# Patient Record
Sex: FEMALE | Race: WHITE | NOT HISPANIC OR LATINO | Employment: FULL TIME
[De-identification: names, ages, dates, MRNs, and addresses within clinical notes are randomized per-mention and may not be internally consistent; named-entity substitution may affect disease eponyms.]

---

## 2017-08-19 ENCOUNTER — HEALTH MAINTENANCE LETTER (OUTPATIENT)
Age: 32
End: 2017-08-19

## 2021-02-26 ENCOUNTER — OFFICE VISIT (OUTPATIENT)
Dept: URGENT CARE | Facility: URGENT CARE | Age: 36
End: 2021-02-26
Payer: COMMERCIAL

## 2021-02-26 ENCOUNTER — E-VISIT (OUTPATIENT)
Dept: URGENT CARE | Facility: URGENT CARE | Age: 36
End: 2021-02-26
Payer: COMMERCIAL

## 2021-02-26 ENCOUNTER — RESULTS ONLY (OUTPATIENT)
Dept: LAB | Age: 36
End: 2021-02-26

## 2021-02-26 DIAGNOSIS — Z20.822 SUSPECTED COVID-19 VIRUS INFECTION: ICD-10-CM

## 2021-02-26 DIAGNOSIS — Z20.822 SUSPECTED COVID-19 VIRUS INFECTION: Primary | ICD-10-CM

## 2021-02-26 DIAGNOSIS — Z53.9 ERRONEOUS ENCOUNTER--DISREGARD: Primary | ICD-10-CM

## 2021-02-26 LAB
LABORATORY COMMENT REPORT: NORMAL
SARS-COV-2 RNA RESP QL NAA+PROBE: NEGATIVE
SARS-COV-2 RNA RESP QL NAA+PROBE: NORMAL
SPECIMEN SOURCE: NORMAL
SPECIMEN SOURCE: NORMAL

## 2021-02-26 PROCEDURE — 99421 OL DIG E/M SVC 5-10 MIN: CPT | Performed by: FAMILY MEDICINE

## 2021-02-26 NOTE — PATIENT INSTRUCTIONS
Dear Maryanne Head,    Your symptoms show that you may have coronavirus (COVID-19). This illness can cause fever, cough and trouble breathing. Many people get a mild case and get better on their own. Some people can get very sick.    Will I be tested for COVID-19?  We would like to test you for Covid-19 virus. I have placed orders for this test.     For all employees or close contacts (except [facility] and [facility] - see below), go to your [employer] home page and scroll down to the section that says  You have an appointment that needs to be scheduled  and click the large green button that says  Schedule Now  and follow the steps to find the next available opening.     If you are unable to complete these steps or if you cannot find any available times, please call [phone] to schedule employee testing.     [facility] employees or close contacts, please call [phone].   [place] ([facility]) employees or close contacts call [phone].    Return to work/school/ guidance:  Please let your workplace manager and staffing office know when your quarantine ends     We can t give you an exact date as it depends on the above. You can calculate this on your own or work with your manager/staffing office to calculate this. (For example if you were exposed on 10/4, you would have to quarantine for 14 full days. That would be through 10/18. You could return on 10/19.)      If you receive a positive COVID-19 test result, follow the guidance of the those who are giving you the results. Usually the return to work is 10 (or in some cases 20 days from symptom onset.) If you work at [employer] [place], you must also be cleared by Employee Occupational Health and Safety to return to work.        If you receive a negative COVID-19 test result and did not have a high risk exposure to someone with a known positive COVID-19 test, you can return to work once you're free of fever for 24 hours without fever-reducing medication and  your symptoms are improving or resolved.      If you receive a negative COVID-19 test and If you had a high risk exposure to someone who has tested positive for COVID-19 then you can return to work 14 days after your last contact with the positive individual    Note: If you have ongoing exposure to the covid positive person, this quarantine period may be more than 14 days. (For example, if you are continued to be exposed to your child who tested positive and cannot isolate from them, then the quarantine of 7-14 days can't start until your child is no longer contagious. This is typically 10 days from onset of the child's symptoms. So the total duration may be 17-24 days in this case.)    Sign up for [employer].   We know it's scary to hear that you might have COVID-19. We want to track your symptoms to make sure you're okay over the next 2 weeks. Please look for an email from [employer]--this is a free, online program that we'll use to keep in touch. To sign up, follow the link in the email you will receive. Learn more at [email or web address]    How can I take care of myself?    Get lots of rest. Drink extra fluids (unless a doctor has told you not to)    Take Tylenol (acetaminophen) or ibuprofen for fever or pain. If you have liver or kidney problems, ask your family doctor if it's okay to take Tylenol o ibuprofen    If you have other health problems (like cancer, heart failure, an organ transplant or severe kidney disease): Call your specialty clinic if you don't feel better in the next 2 days.    Know when to call 911. Emergency warning signs include:  o Trouble breathing or shortness of breath  o Pain or pressure in the chest that doesn't go away  o Feeling confused like you haven't felt before, or not being able to wake up  o Bluish-colored lips or face    Where can I get more information?   [place] - About COVID-19:   [email or web address]    CDC - What to Do If You're Sick:    www.cdc.gov/coronavirus/2019-ncov/about/steps-when-sick.html

## 2021-04-10 ENCOUNTER — HEALTH MAINTENANCE LETTER (OUTPATIENT)
Age: 36
End: 2021-04-10

## 2021-04-14 ENCOUNTER — OFFICE VISIT (OUTPATIENT)
Dept: MIDWIFE SERVICES | Facility: CLINIC | Age: 36
End: 2021-04-14
Payer: COMMERCIAL

## 2021-04-14 VITALS
HEART RATE: 88 BPM | DIASTOLIC BLOOD PRESSURE: 85 MMHG | HEIGHT: 65 IN | OXYGEN SATURATION: 98 % | BODY MASS INDEX: 24.16 KG/M2 | SYSTOLIC BLOOD PRESSURE: 133 MMHG | WEIGHT: 145 LBS

## 2021-04-14 DIAGNOSIS — Z01.419 WELL WOMAN EXAM WITH ROUTINE GYNECOLOGICAL EXAM: Primary | ICD-10-CM

## 2021-04-14 LAB
CHOLEST SERPL-MCNC: 185 MG/DL
HBA1C MFR BLD: 5.1 % (ref 0–5.6)
TSH SERPL DL<=0.005 MIU/L-ACNC: 2.22 MU/L (ref 0.4–4)

## 2021-04-14 PROCEDURE — G0145 SCR C/V CYTO,THINLAYER,RESCR: HCPCS | Performed by: ADVANCED PRACTICE MIDWIFE

## 2021-04-14 PROCEDURE — 82465 ASSAY BLD/SERUM CHOLESTEROL: CPT | Performed by: ADVANCED PRACTICE MIDWIFE

## 2021-04-14 PROCEDURE — 84443 ASSAY THYROID STIM HORMONE: CPT | Performed by: ADVANCED PRACTICE MIDWIFE

## 2021-04-14 PROCEDURE — 83036 HEMOGLOBIN GLYCOSYLATED A1C: CPT | Performed by: ADVANCED PRACTICE MIDWIFE

## 2021-04-14 PROCEDURE — G0124 SCREEN C/V THIN LAYER BY MD: HCPCS | Performed by: PATHOLOGY

## 2021-04-14 PROCEDURE — 36415 COLL VENOUS BLD VENIPUNCTURE: CPT | Performed by: ADVANCED PRACTICE MIDWIFE

## 2021-04-14 PROCEDURE — 87624 HPV HI-RISK TYP POOLED RSLT: CPT | Performed by: ADVANCED PRACTICE MIDWIFE

## 2021-04-14 PROCEDURE — 99385 PREV VISIT NEW AGE 18-39: CPT | Performed by: ADVANCED PRACTICE MIDWIFE

## 2021-04-14 RX ORDER — VALACYCLOVIR HYDROCHLORIDE 500 MG/1
500 TABLET, FILM COATED ORAL 2 TIMES DAILY
COMMUNITY
End: 2023-07-14

## 2021-04-14 SDOH — HEALTH STABILITY: MENTAL HEALTH: HOW OFTEN DO YOU HAVE 6 OR MORE DRINKS ON ONE OCCASION?: NEVER

## 2021-04-14 SDOH — HEALTH STABILITY: MENTAL HEALTH: HOW OFTEN DO YOU HAVE A DRINK CONTAINING ALCOHOL?: 4 OR MORE TIMES A WEEK

## 2021-04-14 SDOH — HEALTH STABILITY: MENTAL HEALTH: HOW MANY STANDARD DRINKS CONTAINING ALCOHOL DO YOU HAVE ON A TYPICAL DAY?: 1 OR 2

## 2021-04-14 ASSESSMENT — MIFFLIN-ST. JEOR: SCORE: 1343.6

## 2021-04-14 NOTE — PROGRESS NOTES
Maryanne is a 35 year old No obstetric history on file. female who presents for annual exam.     Menses are regular q 28-30 days and normal lasting 4 days.  Menses flow: normal.  Patient's last menstrual period was 2021.. Using none for contraception.  She is  currently considering pregnancy.  Besides routine health maintenance, she has no other health concerns today .  1. Reports having an abnormal pap smear, with colpo and bx.  Then had a follow up pap at the beginning of her pregnancy about 2  1/2 yrs ago which showed there had been an improvement in her results but has not had a chance to follow up since then.  2. Pt would like to conceive again.  , has an 18 mo/old.  Had infertility testing for her and her  including a normal hsg,  had a normal semen analysis.  Pt had one cycle of clomid, then a trigger shot and conceived on that round.    2019  pt reported uncomplicated pregnancy, was induced for post dates,  x 10 months  Has been tracking cycles and noting ovulation discharge since 2021 (4 months)  Reports regular 28 day cycles, regular unprotected IC.  GYNECOLOGIC HISTORY:  Menarche:   Maryanne is sexually active with 1male partner(s) and is currently in monogamous relationship.    History sexually transmitted infections:Herpes  STI testing offered?  Declined  RAMANA exposure: Unknown  History of abnormal Pap smear: NO - age 30- 65 PAP every 3 years recommended  Family history of breast CA: No  Family history of uterine/ovarian CA: No    Family history of colon CA: No    HEALTH MAINTENANCE:  Cholesterol: (No results found for: CHOL History of abnormal lipids: No  Mammo: na . History of abnormal Mammo: Not applicable.  Regular Self Breast Exams: No  Calcium/Vitamin D intake: source:  dairy Adequate? 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 Mother         MI in the past - 2 stents in heart     Hypertension Mother      Alcohol/Drug Mother         sober since 83     Arthritis Mother      Cardiovascular Mother      Depression Mother      Heart Disease Mother      Bronchitis Mother      Coronary Artery Disease Mother      Kidney Disease Mother      Hypertension Father      Social History     Socioeconomic History     Marital status:      Spouse name: Not on file     Number of children: Not on file     Years of education: Not on file     Highest education level: Not on file   Occupational History     Not on file   Social Needs     Financial resource strain: Not on file     Food insecurity     Worry: Not on file     Inability: Not on file     Transportation needs     Medical: Not on file     Non-medical: Not on file   Tobacco Use     Smoking status: Former Smoker     Quit date: 3/1/2005     Years since quittin.1     Tobacco comment: smoked for about 2 yrs about 1/4 ppd   Substance and Sexual Activity     Alcohol use: Yes     Comment: rare     Drug use: Not on file     Sexual activity: Yes     Partners: Male     Birth control/protection: Pill   Lifestyle     Physical activity     Days per week: Not on file     Minutes per session: Not on file     Stress: Not on file   Relationships     Social connections     Talks on phone: Not on file     Gets together: Not on file     Attends Yarsani service: Not on file     Active member of club or organization: Not on file     Attends meetings of clubs or organizations: Not on file     Relationship status: Not  on file     Intimate partner violence     Fear of current or ex partner: Not on file     Emotionally abused: Not on file     Physically abused: Not on file     Forced sexual activity: Not on file   Other Topics Concern     Not on file   Social History Narrative    12/21/05    Balanced Diet - Yes    Osteoporosis Prevention Measures - Weight bearing exercise    Regular Exercise -  Yes Describe yoga    Dental Exam - NO    Eye Exam - YES - Date: couple weeks ago    Self Breast Exam - No    Abuse: Current or Past (Physical, Sexual or Emotional)- No    Do you feel safe in your environment - Yes    Guns stored in the home - No    Sunscreen used - Yes    Seatbelts used - Yes    Lipids -  UNKNOWN; last exam    Glucose -  UNKNOWN; last exam    Colon Cancer Screening - No    Hemoccults - NO    Pap Test -  YES - Date: 2 mo ago    Do you have any concerns about STD's -  No    Mammography - NO    DEXA - NO    Immunizations reviewed and up to date - Yes    Milton GOMEZ RN, BSN, PHN           Current Outpatient Medications:      Prenatal Vit-Fe Fumarate-FA (PRENATAL VITAMIN PO), , Disp: , Rfl:      valACYclovir (VALTREX) 500 MG tablet, Take 500 mg by mouth 2 times daily, Disp: , Rfl:    No Known Allergies    Past medical, surgical, social and family history were reviewed and updated in EPIC.    ROS:   C:     NEGATIVE for fever, chills, change in weight  I:       NEGATIVE for worrisome rashes, moles or lesions  E:     NEGATIVE for vision changes or irritation  E/M: NEGATIVE for ear, mouth and throat problems  R:     NEGATIVE for significant cough or SOB  CV:   NEGATIVE for chest pain, palpitations or peripheral edema  GI:     NEGATIVE for nausea, abdominal pain, heartburn, or change in bowel habits  :   NEGATIVE for frequency, dysuria, hematuria, vaginal discharge, or irregular bleeding  M:     NEGATIVE for significant arthralgias or myalgia  N:      NEGATIVE for weakness, dizziness or paresthesias  E:      NEGATIVE for temperature  "intolerance, skin/hair changes  P:      NEGATIVE for changes in mood or affect.    EXAM:  /85   Pulse 88   Ht 1.651 m (5' 5\")   Wt 65.8 kg (145 lb)   LMP 03/27/2021   SpO2 98%   BMI 24.13 kg/m     BMI: Body mass index is 24.13 kg/m .  Constitutional: healthy, alert and no distress  Head: Normocephalic. No masses, lesions, tenderness or abnormalities  Neck: Neck supple. Trachea midline. No adenopathy. Thyroid symmetric, normal size.   Cardiovascular: RRR.   Respiratory: Negative.   Breast: No nodularity, asymmetry or nipple discharge bilaterally.  Gastrointestinal: Abdomen soft, non-tender, non-distended. No masses, organomegaly.  :  Vulva:  No external lesions, normal female hair distribution, no inguinal adenopathy.    Urethra:  Midline, non-tender, well supported, no discharge  Vagina:  Moist, pink, no abnormal discharge, no lesions  Uterus:  Normal size , non-tender, freely mobile  Ovaries:  No masses appreciated, non-tender, mobile  Rectal Exam: deferred  Musculoskeletal: extremities normal  Skin: no suspicious lesions or rashes  Psychiatric: Affect appropriate, cooperative,mentation appears normal.     COUNSELING:   Reviewed preventive health counseling, as reflected in patient instructions       Regular exercise       Healthy diet/nutrition       conception   reports that she has been smoking cigarettes. She has never used smokeless tobacco.   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  Discussed effect of smoking cigarettes on fertility and on cervical health.  Pt stated understanding and that she intends to quit.    Discussed cycle tips for optimizing conception, pt very well informed already.  Plans that if not pregnant in 2 more months, so 6 months of unprotected IC, will seek appt with OB/Gyn.    Body mass index is 24.13 kg/m .    FRAX Risk " Assessment    ASSESSMENT:  35 year old female with satisfactory annual exam  (Z01.419) Well woman exam with routine gynecological exam  (primary encounter diagnosis)  Comment:   Plan: HPV High Risk Types DNA Cervical, Pap imaged         thin layer screen with HPV - recommended age 30        - 65 years (select HPV order below), TSH with         free T4 reflex, Cholesterol, Hemoglobin A1c    ZAYNAB Wagner CNM

## 2021-04-14 NOTE — PATIENT INSTRUCTIONS
Dear Maryanne     Congratulations on deciding that you would like to conceive.  Here are a couple of tips about getting pregnant.     First, it takes time, be patient.  It is normal to feel frustrated if you don t get pregnant on your first cycle of trying, but don t get discouraged, it will happen.  It takes the average couple eight months to conceive.      A great resource is the book, Taking Charge of Your Fertility      The basics of ovulation     Each month your body goes through several hormonal changes that causes you to release an egg from your ovary, this is called ovulation, and it is when you can become pregnant.      Typically this process takes about two weeks after the first day of your last period.  We begin counting the first day of your period and call this Day 1 of your menstrual cycle.     Once the egg has been released, you have ovulated and the egg is only fertile for about 24 hours.  The key is to have unprotected intercourse (sex) in the few days before and during ovulation.     Sperm can live and function for about 4-5 days in your uterus and fallopian tubes.  So the goal is to have the sperm there waiting or be there right as your ovulate.     Getting the timing right     The best way of increasing your odds in getting pregnant is to have sex at the correct time in your cycle.     If you have regular 28 day cycles you will ovulate about 12 to 14 days after the first day of your last period.  So your fertile window is 5-6 days just before you ovulate, (Day 6-12 of your menstrual cycle)     If you have irregular cycles it can be a little more difficult to find your ovulation window.        Signs of Ovulation     Cervical mucous discharge:  One of the best and most reliable ways to determine if you are ovulating is to watch for cervical mucous discharge.  It will be vaginal discharge that is clear, wet and slippery like an egg white.  This discharge coats the inside of the genital tract and makes  it easier and more slippery for the sperm to get up to the fallopian tube where the fertile egg will be.     Ovulation predictor kits  You can buy ovulation predictor kits over the counter.  These measure the ovulation hormone (Luteinizing Hormone or LH) in your urine.  You can use this in addition to watching for cervical mucous to double check when you are ovulating.     Basal Body Temperature  You can take your temperature every morning before you get out of bed. The body temperature is lower before ovulation and rises slightly afterwards. This is a less reliable way of tracking ovulation because the change occurs after ovulation.     Menstrual Cycle Tracking Apps  There are many apps for tracking your period and fertility windows.  Here a couple of web sites that review various ones.  We do not endorse a specific one, find one that seems to appeal to you and meets your needs.  Search  best fertility apps     https://www.[employer]/health/pregnancy/fertility-apps  https://www.parents.com/getting-pregnant/ovulation/fertile-days/the-10-best-period-and-ovulation-tracker-apps/       Positions, orgasms, and lube  There are a lot of myths about sex, fertility, and how to make pregnancy more likely. Some of these recommend different positions or keeping the hips elevated after sex for a period of time.      Others claim that if the woman orgasms (or doesn t), conception is more likely. Unfortunately, there are no studies that back up these claims.     The one thing you should think about is your lubricant. Certain products can decrease sperm motility and viability. These are important when trying to get pregnant.   You ll want to avoid:            Astroglide            K-Y jelly            saliva            olive oil  If you need to use a lubricant, try Pre-Seed, mineral oil, or canola oil. These products won't interfere with your partner s sperm.     Timing of ejaculation  For the male, it takes an average of about  72 hours to regenerate an adequate number of sperm.  So during your fertile window aim for unprotected sex about every other day, and not more than once a day.     Healthy body, healthy pregnancy    Before trying to get pregnant, you should try to be as healthy as possible.      At this preconception visit, you ll talk about existing health problems and get screened for genetic diseases. You can also address other health concerns you might have.  Your doctor might recommend that you make lifestyle changes before you get pregnant.      These might include:            getting to a healthy weight            improving diet/exercise habits            eliminating alcohol or cigarettes            cutting back on caffeine                If you drink a lot of coffee or soda, it may be helpful to begin cutting back now. Current recommendations are to limit caffeine intake to about 200 mg per day. This is equivalent to a 12-ounce cup of coffee.      You should also start taking a prenatal vitamin with at least 400 - 800 mcg of folic acid each day as soon as you decide to start trying to conceive. This is to reduce the risk of certain birth defects.     When to get help  Most healthy couples will conceive within a year of actively trying to get pregnant. If you don t get pregnant within a year and are under age 35, you should see your doctor for a fertility evaluation.      If you are over 35, you should only wait six months before seeing a doctor.  Couples should also see a fertility specialist if they have a history of multiple miscarriages or know that they have a genetic or medical condition that might affect their fertility.     The takeaway  It can be frustrating when pregnancy doesn't happen right away, but try to be patient. This is normal, and it doesn t mean that it will never happen for you. Try to keep baby-making fun and adventurous.   Stay relaxed and you ll increase your chances of getting that positive result  you ve been waiting for.     I wish you the best    ZAYNAB Wagner CNM

## 2021-04-19 LAB
COPATH REPORT: ABNORMAL
PAP: ABNORMAL

## 2021-04-20 ENCOUNTER — PATIENT OUTREACH (OUTPATIENT)
Dept: MIDWIFE SERVICES | Facility: CLINIC | Age: 36
End: 2021-04-20

## 2021-04-20 LAB
FINAL DIAGNOSIS: NORMAL
HPV HR 12 DNA CVX QL NAA+PROBE: NEGATIVE
HPV16 DNA SPEC QL NAA+PROBE: NEGATIVE
HPV18 DNA SPEC QL NAA+PROBE: NEGATIVE
SPECIMEN DESCRIPTION: NORMAL
SPECIMEN SOURCE CVX/VAG CYTO: NORMAL

## 2021-09-19 ENCOUNTER — HEALTH MAINTENANCE LETTER (OUTPATIENT)
Age: 36
End: 2021-09-19

## 2022-01-31 ENCOUNTER — TELEPHONE (OUTPATIENT)
Dept: OPHTHALMOLOGY | Facility: CLINIC | Age: 37
End: 2022-01-31
Payer: COMMERCIAL

## 2022-01-31 NOTE — TELEPHONE ENCOUNTER
[facility] Call Center    Phone Message    May a detailed message be left on voicemail: yes     Reason for Call: pt sent online appt req for eye exam due to floaters - per GL's they say to send Te to clinic     Please advise     Action Taken: Message routed to:  [facility] eye     Travel Screening: Not Applicable

## 2022-01-31 NOTE — TELEPHONE ENCOUNTER
I called and spoke with patient to schedule her for eye exam.  Last CEE was in 2017 in [place].  Has had minimal floaters in vision x 3 years.  Recent increase in number of floaters left eye, some flashes of light when lying down at night.  No eye pain, redness, tearing either eye.  BRADLY Rojo 1/31/2022 4:02 PM

## 2022-02-04 ENCOUNTER — OFFICE VISIT (OUTPATIENT)
Dept: OPHTHALMOLOGY | Facility: CLINIC | Age: 37
End: 2022-02-04
Attending: OPHTHALMOLOGY
Payer: COMMERCIAL

## 2022-02-04 DIAGNOSIS — H43.813 VITREOUS DEGENERATION OF BOTH EYES: Primary | ICD-10-CM

## 2022-02-04 PROCEDURE — 99203 OFFICE O/P NEW LOW 30 MIN: CPT | Mod: GC | Performed by: STUDENT IN AN ORGANIZED HEALTH CARE EDUCATION/TRAINING PROGRAM

## 2022-02-04 PROCEDURE — G0463 HOSPITAL OUTPT CLINIC VISIT: HCPCS

## 2022-02-04 ASSESSMENT — CUP TO DISC RATIO
OD_RATIO: 0.3
OS_RATIO: 0.3

## 2022-02-04 ASSESSMENT — CONF VISUAL FIELD
OD_NORMAL: 1
OS_NORMAL: 1

## 2022-02-04 ASSESSMENT — SLIT LAMP EXAM - LIDS
COMMENTS: NORMAL
COMMENTS: NORMAL

## 2022-02-04 ASSESSMENT — VISUAL ACUITY
METHOD: SNELLEN - LINEAR
OD_SC: 20/15
OS_SC: 20/15

## 2022-02-04 ASSESSMENT — EXTERNAL EXAM - RIGHT EYE: OD_EXAM: NORMAL

## 2022-02-04 ASSESSMENT — TONOMETRY
OD_IOP_MMHG: 16
OS_IOP_MMHG: 16
IOP_METHOD: TONOPEN

## 2022-02-04 ASSESSMENT — EXTERNAL EXAM - LEFT EYE: OS_EXAM: NORMAL

## 2022-02-04 NOTE — NURSING NOTE
Chief Complaints and History of Present Illnesses   Patient presents with     Floaters Both Eyes     Chief Complaint(s) and History of Present Illness(es)     Floaters Both Eyes     Laterality: left eye    Associated symptoms: flashes.  Negative for shade, peripheral vision loss, headache and photophobia    Pain scale: 0/10              Comments     Flashes and floaters LE X 6 months  Veronica WALLS 11:01 AM February 4, 2022

## 2022-02-04 NOTE — PROGRESS NOTES
CC - Flashes/floaters    INTERVAL HISTORY - Initial visit with me. Minimal floaters for 3 years but recent increase and associated flashes most noticeable at night in the LEFT eye. This has happened just a few times - last 2 weeks ago, happened one time and then stopped.     HPI -   Maryanne Head is a 36 year old year-old patient who presents for flashes/floaters in the LEFT eye.   Last eye exam 2017 in [place].   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    -Warning signs discussed, including worsening floaters/flashes, curtain/veil obscuring vision.    # Physiologic anisocoria   -Negative MRI/CT ~2005, per note from 4/1/2014   -Warning signs discussed - diplopia, ptosis      return to clinic: 1-2 years for routine eye exam.     Guru Barakat MD  Ophthalmology PGY-4  [facility]     Complete documentation of historical and exam elements from today's encounter can be found in the full encounter summary report (not reduplicated in this progress note). I personally obtained the chief complaint(s) and history of present illness.  I confirmed and edited as necessary the review of systems, past medical/surgical history, family history, social history, and examination findings as documented by others; and I examined the patient myself. I personally reviewed the relevant tests, images, and reports as documented above. I formulated and edited as necessary the assessment and plan and discussed the findings and management plan with the patient and family.    Gilbert Calhoun MD, PhD

## 2022-03-08 PROCEDURE — 88304 TISSUE EXAM BY PATHOLOGIST: CPT | Mod: TC,ORL | Performed by: OBSTETRICS & GYNECOLOGY

## 2022-03-09 ENCOUNTER — LAB REQUISITION (OUTPATIENT)
Dept: LAB | Facility: CLINIC | Age: 37
End: 2022-03-09
Payer: COMMERCIAL

## 2022-03-10 LAB
PATH REPORT.COMMENTS IMP SPEC: NORMAL
PATH REPORT.COMMENTS IMP SPEC: NORMAL
PATH REPORT.FINAL DX SPEC: NORMAL
PATH REPORT.GROSS SPEC: NORMAL
PATH REPORT.MICROSCOPIC SPEC OTHER STN: NORMAL
PATH REPORT.RELEVANT HX SPEC: NORMAL
PHOTO IMAGE: NORMAL

## 2022-03-10 PROCEDURE — 88304 TISSUE EXAM BY PATHOLOGIST: CPT | Mod: 26 | Performed by: PATHOLOGY

## 2022-03-29 ENCOUNTER — PATIENT OUTREACH (OUTPATIENT)
Dept: MIDWIFE SERVICES | Facility: CLINIC | Age: 37
End: 2022-03-29
Payer: COMMERCIAL

## 2022-03-29 DIAGNOSIS — R87.610 ASCUS OF CERVIX WITH NEGATIVE HIGH RISK HPV: ICD-10-CM

## 2022-03-29 NOTE — LETTER
March 29, 2022      Maryanne Head  [address]  [place] [address]        Dear ,    This letter is to remind you that you are due for your follow-up Pap smear and Human Papillomavirus (HPV) test.    Please call [phone] to schedule your appointment at your earliest convenience.    If you have completed the appointment outside of the [facility] system, please have the records forwarded to our office. We will update your chart for your provider to review before your next annual wellness visit.     Thank you for choosing [facility]!      Sincerely,    Your [facility] Care Team

## 2022-04-29 NOTE — TELEPHONE ENCOUNTER
Patient due for Pap and HPV.    Reminder done today via telephone call-pt said that her insurance changed and she is no longer coming to [facility].

## 2022-06-26 ENCOUNTER — HEALTH MAINTENANCE LETTER (OUTPATIENT)
Age: 37
End: 2022-06-26

## 2022-10-07 ENCOUNTER — OFFICE VISIT (OUTPATIENT)
Dept: URGENT CARE | Facility: URGENT CARE | Age: 37
End: 2022-10-07
Payer: COMMERCIAL

## 2022-10-07 VITALS
HEIGHT: 64 IN | WEIGHT: 150 LBS | TEMPERATURE: 98.6 F | DIASTOLIC BLOOD PRESSURE: 73 MMHG | OXYGEN SATURATION: 97 % | HEART RATE: 76 BPM | SYSTOLIC BLOOD PRESSURE: 107 MMHG | BODY MASS INDEX: 25.61 KG/M2 | RESPIRATION RATE: 16 BRPM

## 2022-10-07 DIAGNOSIS — J02.9 SORE THROAT: Primary | ICD-10-CM

## 2022-10-07 LAB
DEPRECATED S PYO AG THROAT QL EIA: NEGATIVE
GROUP A STREP BY PCR: NOT DETECTED

## 2022-10-07 PROCEDURE — 87651 STREP A DNA AMP PROBE: CPT | Performed by: FAMILY MEDICINE

## 2022-10-07 PROCEDURE — U0005 INFEC AGEN DETEC AMPLI PROBE: HCPCS | Performed by: FAMILY MEDICINE

## 2022-10-07 PROCEDURE — 99203 OFFICE O/P NEW LOW 30 MIN: CPT | Mod: CS | Performed by: FAMILY MEDICINE

## 2022-10-07 PROCEDURE — U0003 INFECTIOUS AGENT DETECTION BY NUCLEIC ACID (DNA OR RNA); SEVERE ACUTE RESPIRATORY SYNDROME CORONAVIRUS 2 (SARS-COV-2) (CORONAVIRUS DISEASE [COVID-19]), AMPLIFIED PROBE TECHNIQUE, MAKING USE OF HIGH THROUGHPUT TECHNOLOGIES AS DESCRIBED BY CMS-2020-01-R: HCPCS | Performed by: FAMILY MEDICINE

## 2022-10-07 NOTE — PROGRESS NOTES
Assessment & Plan     Sore throat  - Streptococcus A Rapid Screen w/Reflex to PCR - Clinic Collect  - Group A Streptococcus PCR Throat Swab  - Streptococcus A Rapid Screen w/Reflex to PCR - Clinic Collect  - Symptomatic; Yes; 10/5/2022 COVID-19 Virus (Coronavirus) by PCR Nose       Benign exam, COVID test collected today.   Quarantine until COVID test returns    Offered to repeat strep test as Maryanne had concerns about the quality of the sample collected - discussed we can wait for PCR results if sore throat doesn't improve in next 3-5 days we can send in a Rx for amoxicillin; did offer to repeat rapid strep but in the end we opted to not proceed. No signs of peritonsillar abscess. Lung exam clear.     Salt water gargles and PRN OTC analgesics recommended    See AVS summary for additional recommendations reviewed with patient during this visit.       Darren Alejandro MD   [facility]    Subjective     Maryanne is a 37 year old female who presents to clinic today for the following health issues:  Chief Complaint   Patient presents with     Urgent Care     Pharyngitis     Sore throat x2 days.      Headache     Otalgia     Generalized Body Aches     HPI  Absent of fever. Symptoms as above. No recorded fevers. No exposures to COVID  No cough.  Sore throat with exudates seen previously.   Works as inpatient ICU doc     No difficulty with swallowing or opening mouth.     SH: trained as psychiatrist in [place]      Patient Active Problem List    Diagnosis Date Noted     ASCUS of cervix with negative high risk HPV 04/14/2021     Priority: Medium     Reports having an abnormal pap smear, with colpo and bx.  Then had a follow up pap at the beginning of her pregnancy about 2 1/2 yrs ago which showed there had been an improvement in her results but has not had a chance to follow up since then.  04/14/21 ASCUS Pap, Neg HR HPV. Plan cotest in 1 year, due 04/14/22. Results and recommendations released to the pt through  "[facility] pt viewed.  3/29/22 Reminder letter  04/29/22 Reminder call-pt said that her insurance changed and she is no longer coming to [facility].        Depressive disorder, not elsewhere classified 12/21/2005     Priority: Medium     Current Outpatient Medications   Medication     Prenatal Vit-Fe Fumarate-FA (PRENATAL VITAMIN PO)     valACYclovir (VALTREX) 500 MG tablet     No current facility-administered medications for this visit.         Objective    /73   Pulse 76   Temp 98.6  F (37  C) (Temporal)   Resp 16   Ht 1.626 m (5' 4\")   Wt 68 kg (150 lb)   SpO2 97%   BMI 25.75 kg/m    Physical Exam     Ears: normal TMs ( old scarring), no perforation  Throat: no trismus, small exudate R tonsil, erythematous 3+ tonsils, uvula midline  Pulm: clear bilaterally  CV: RRR no m/r/g  Neck: no enlarged nodes    Results for orders placed or performed in visit on 10/07/22   Streptococcus A Rapid Screen w/Reflex to PCR - Clinic Collect     Status: Normal    Specimen: Throat; Swab   Result Value Ref Range    Group A Strep antigen Negative Negative                   The use of Dragon/ExaDigm dictation services may have been used to construct the content in this note; any grammatical or spelling errors are non-intentional. Please contact the author of this note directly if you are in need of any clarification.   "

## 2022-10-07 NOTE — PATIENT INSTRUCTIONS
Salt water gargles/rinses 3-4 times a day    Ibuprofen/tylenol as needed every 4 hours for sore throat      Results for the COVID PCR test collected today takes about 24 hours to return.   You can check your result in [facility] - our nurses only call if the tests return positive. If you have reviewed your test results in [facility] before they make a phone call then you likely not be hearing from the nurses.      Please quarantine at this time.     If you are positive for COVID you may be eligible for anti-viral therapy -- your options include but are not limited to:   1) schedule a virtual appointment thru [facility] (for a same day appointment)  2) call your Doctor's office right away  3) return to urgent care (please wear a mask and notify them you are COVID positive and are seeking anti-viral treatment)      If your symptoms get worse: chest pain, shortness of breath, lightheadedness/dizziness -- please seek medical attention right away. Go to the hospital immediately if the symptoms are severe.

## 2022-10-08 LAB — SARS-COV-2 RNA RESP QL NAA+PROBE: NEGATIVE

## 2022-11-21 ENCOUNTER — HEALTH MAINTENANCE LETTER (OUTPATIENT)
Age: 37
End: 2022-11-21

## 2023-01-16 LAB
HEPATITIS B SURFACE ANTIGEN (EXTERNAL): NONREACTIVE
HIV1+2 AB SERPL QL IA: NONREACTIVE

## 2023-02-07 ENCOUNTER — TRANSFERRED RECORDS (OUTPATIENT)
Dept: HEALTH INFORMATION MANAGEMENT | Facility: CLINIC | Age: 38
End: 2023-02-07

## 2023-02-07 LAB — RUBELLA ANTIBODY IGG (EXTERNAL): NONREACTIVE

## 2023-06-02 LAB — TREPONEMA PALLIDUM ANTIBODY (EXTERNAL): NONREACTIVE

## 2023-06-20 ENCOUNTER — TELEPHONE (OUTPATIENT)
Dept: OBGYN | Facility: CLINIC | Age: 38
End: 2023-06-20

## 2023-06-20 NOTE — TELEPHONE ENCOUNTER
How many weeks today or TAMANNA:  09/03/2023     Transferring from: [facility]     Transferring to: (specific doctor/group/midwives) Doctor group    Reason for Transfer: Doctor at [facility]       Any Previous C-sections: NO     Fax number given for records: [phone]

## 2023-07-09 ENCOUNTER — HEALTH MAINTENANCE LETTER (OUTPATIENT)
Age: 38
End: 2023-07-09

## 2023-07-10 ENCOUNTER — VIRTUAL VISIT (OUTPATIENT)
Dept: OBGYN | Facility: CLINIC | Age: 38
End: 2023-07-10
Payer: COMMERCIAL

## 2023-07-10 DIAGNOSIS — O09.529 SUPERVISION OF HIGH-RISK PREGNANCY OF ELDERLY MULTIGRAVIDA: Primary | ICD-10-CM

## 2023-07-10 PROBLEM — E03.9 HYPOTHYROID IN PREGNANCY, ANTEPARTUM: Status: ACTIVE | Noted: 2023-03-15

## 2023-07-10 PROBLEM — O99.280 HYPOTHYROID IN PREGNANCY, ANTEPARTUM: Status: ACTIVE | Noted: 2023-03-15

## 2023-07-10 PROBLEM — O36.63X0 EXCESSIVE FETAL GROWTH AFFECTING MANAGEMENT OF MOTHER IN SINGLETON PREGNANCY IN THIRD TRIMESTER, ANTEPARTUM: Status: ACTIVE | Noted: 2023-06-13

## 2023-07-10 PROBLEM — N97.9 FEMALE INFERTILITY, SECONDARY: Status: ACTIVE | Noted: 2022-05-24

## 2023-07-10 PROCEDURE — 99207 PR NO CHARGE NURSE ONLY: CPT

## 2023-07-10 RX ORDER — PANTOPRAZOLE SODIUM 20 MG/1
1 TABLET, DELAYED RELEASE ORAL DAILY
COMMUNITY
Start: 2023-06-29 | End: 2023-07-24

## 2023-07-10 RX ORDER — POLYETHYLENE GLYCOL 3350
17 POWDER (GRAM) MISCELLANEOUS
COMMUNITY
Start: 2023-06-29 | End: 2023-10-11

## 2023-07-10 RX ORDER — FAMOTIDINE 20 MG/1
20 TABLET, FILM COATED ORAL 2 TIMES DAILY
Status: ON HOLD | COMMUNITY
Start: 2023-04-11 | End: 2023-08-29

## 2023-07-10 RX ORDER — AMOXICILLIN 250 MG
1 CAPSULE ORAL 2 TIMES DAILY PRN
COMMUNITY
Start: 2023-06-29 | End: 2024-03-08

## 2023-07-10 RX ORDER — LEVOTHYROXINE SODIUM 50 UG/1
50 TABLET ORAL DAILY
COMMUNITY
Start: 2023-06-15 | End: 2023-08-20

## 2023-07-10 NOTE — PROGRESS NOTES
SUBJECTIVE:     HPI:    This is a 37 year old female patient,  who presents for her first obstetrical visit.    TAMANNA: 9/3/2023, by Last Menstrual Period.  She is 32w1d weeks.  Her cycles are regular.  Her last menstrual period was normal.   Since her LMP, she has experienced  Constipation, hemorrhoids  and heartburn).   She denies nausea, emesis, abdominal pain, fatigue, headache, loss of appetite, vaginal discharge, dysuria, pelvic pain, urinary urgency, lightheadedness, urinary frequency and vaginal bleeding.    Additional History: -AMA.Will be [age] at the time of delivery.   -HSV type 2.  -Hx of infertility. Had Previously working with [facility] fertility clinic. Had completed egg retrieval 2023 with 1 embryo-was mosaic.did not implant this embryo. Became pregnant on her own spontaneuosly. Denies hx of hypo/hyper thyroidism outside of pregnancy. Currently on 50mcg levothyroxine since 2023.   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     -Constipation-currently on stool softener, miralax. Does have hemorrhoid. Uncomfortable.     -Low lying placenta-  - Repeat US 23 to evaluate placenta site: placental edge to cervical os 30.0mm/3cm. denies bleeding.    Fetal growth   - Last US 23:   - Head circumference >95%ile  - AC 90%ile   - EFW 1552 g 96%ile              Have you travelled during the pregnancy?No  Have your sexual partner(s) travelled during the pregnancy?No      HISTORY:   Planned Pregnancy: Yes  Marital Status:   Occupation:   Living in Household: Spouse and Children    Past History:  Her past medical history   Past Medical History:   Diagnosis Date     Abnormal Pap smear of cervix 2021     Depressive disorder, not elsewhere classified     Dr. Caprice Jean-Baptiste - psychiatrist     Female infertility    .      She has a history of       Since her last LMP she denies use of alcohol, tobacco and street drugs.    Past  medical, surgical, social and family history were reviewed and updated in [facility].        Current Outpatient Medications   Medication     famotidine (PEPCID) 20 MG tablet     levothyroxine (SYNTHROID/LEVOTHROID) 50 MCG tablet     pantoprazole (PROTONIX) 20 MG EC tablet     polyethylene glycol 3350 POWD     Prenatal Vit-Fe Fumarate-FA (PRENATAL VITAMIN PO)     senna-docusate (SENOKOT-S/PERICOLACE) 8.6-50 MG tablet     valACYclovir (VALTREX) 500 MG tablet     No current facility-administered medications for this visit.       ROS:   12 point review of systems negative other than symptoms noted below or in the HPI.  Gastrointestinal: Constipation, Heartburn and Hemorrhoids      OBJECTIVE:     EXAM:  LMP 11/27/2022  There is no height or weight on file to calculate BMI.    Nurse phone visit completed. Prenatal book and folder (containing standard educational hand-outs and brochures) will be given at the next visit to patient. Information in folder reviewed over the phone. Questions answered. Brochure given on optional screening available to assess chromosomal anomalies.  NIPS completed. Pt advised to call the clinic if she has any questions or concerns related to her pregnancy. Prenatal labs future ordered. New prenatal visit scheduled on 7/12/23 with .      Lab Results   Component Value Date    PAP ASC-US 04/14/2021     PHQ-9 score:         No data to display                     No data to display                      Patient supplied answers from flow sheet for:  Prenatal OB Questionnaire.   patient to complete questionnaires   Nell Rodriguez RN

## 2023-07-12 ENCOUNTER — PRENATAL OFFICE VISIT (OUTPATIENT)
Dept: OBGYN | Facility: CLINIC | Age: 38
End: 2023-07-12
Payer: COMMERCIAL

## 2023-07-12 VITALS
DIASTOLIC BLOOD PRESSURE: 60 MMHG | SYSTOLIC BLOOD PRESSURE: 110 MMHG | WEIGHT: 172 LBS | HEIGHT: 64 IN | BODY MASS INDEX: 29.37 KG/M2

## 2023-07-12 DIAGNOSIS — O99.283 HYPOTHYROID IN PREGNANCY, ANTEPARTUM, THIRD TRIMESTER: ICD-10-CM

## 2023-07-12 DIAGNOSIS — O98.513 HERPES SIMPLEX INFECTION IN PREGNANCY, THIRD TRIMESTER: ICD-10-CM

## 2023-07-12 DIAGNOSIS — E03.9 HYPOTHYROID IN PREGNANCY, ANTEPARTUM, THIRD TRIMESTER: ICD-10-CM

## 2023-07-12 DIAGNOSIS — O09.523 HIGH-RISK PREGNANCY, ELDERLY MULTIGRAVIDA IN THIRD TRIMESTER: Primary | ICD-10-CM

## 2023-07-12 DIAGNOSIS — B00.9 HERPES SIMPLEX INFECTION IN PREGNANCY, THIRD TRIMESTER: ICD-10-CM

## 2023-07-12 PROCEDURE — 99214 OFFICE O/P EST MOD 30 MIN: CPT | Performed by: OBSTETRICS & GYNECOLOGY

## 2023-07-12 NOTE — PROGRESS NOTES
This is a 37 year old female patient,  who presents for her first obstetrical visit.     TAMANNA: 9/3/2023, by Last Menstrual Period.  She is 32w3d weeks.  Her cycles are regular.  Her last menstrual period was normal.   Since her LMP, she has experienced  Constipation, hemorrhoids  and heartburn).   She denies nausea, emesis, abdominal pain, fatigue, headache, loss of appetite, vaginal discharge, dysuria, pelvic pain, urinary urgency, lightheadedness, urinary frequency and vaginal bleeding.       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 Was going to have her care there but then realized she doesn't want to have a delivery where she works with people she knows professionally, etc so decided to transfer to our clinic.  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    With this pregnancy she is AMA and will be [age] at the time of delivery.     -HSV type 2 with rare outbreaks but has taken valtrex daily from 36 weeks on with her last preg and o/w just does it prn and typically very rare/not yearly even outbreaks.    -Hx of infertility. Had Previously worked with [facility].  Had completed egg retrieval 2023 with 1 embryo-was mosaic.did not implant this embryo. Became pregnant on her own spontaneuosly.     Denies hx of hypo/hyper thyroidism outside of pregnancy. Currently on 50mcg levothyroxine since 2023.     NIPs testing was negative but she is a carrier of autoimmune polyglandular syndrome type 1, partner is negative.      -Constipation-currently on stool softener, miralax which is helping. Does have a hemorrhoid. Uncomfortable but managing it.      Had normal LII but had low lying placenta-  - Repeat US 23showed it had moved to 3cm from the os and has resolved.  denies bleeding.    In the past week she has noticed HR as much as 115 and can feel it and then give her a sense of anxiety and dizziness. In that moment was not  mentally anxious or anything and the phyical sx seem to bring the mental anxiety. When asked on details of timing it is always when she has been sitting. Sometimes prolonged sitting and sometimes not. Not with exertion, no palpitations, no CP or SOB. Gets a pick in stomach feeling with it. H.o depression but no meds currently and deneis overall depression/anxietyDiscussed physiology of prolonged sitting in pregnancy and body's- mechanism to prevent syncope that may be causing anxiety.   Good FM overall.   Baby boy name is still secret.  With her last pregnancy, she did NSTs every week. Did oral and vaginal miso for cervical ripening. Also did cervical balloon. Never took pitocin. 11hrs of labour, 4 hours of pushing w/o forceps.      She has had HSB2 outbreaks in the past, but has not  taken valtrex in over a year. Did daily preventative from 36 weeks on with her last and wondering if that is the recommendation now as well.       Fetal growth   - Last  23:   - Head circumference >95%ile  - AC 90%ile   - EFW 1552 g 96%il        Have you travelled during the pregnancy?No  Have your sexual partner(s) travelled during the pregnancy?No        HISTORY:   Planned Pregnancy: Yes  Marital Status:   Occupation: psychiatrist  Living in Household: Spouse and Children     Past History:  Her past medical history   Past Medical History        Past Medical History:   Diagnosis Date     Abnormal Pap smear of cervix 2021     Depressive disorder, not elsewhere classified       Dr. Caprice Jean-Baptiste - psychiatrist     Female infertility        .       She has a history of        Since her last LMP she denies use of alcohol, tobacco and street drugs.     Past medical, surgical, social and family history were reviewed and updated in [facility].               Current Outpatient Medications   Medication     famotidine (PEPCID) 20 MG tablet     levothyroxine (SYNTHROID/LEVOTHROID) 50 MCG tablet     pantoprazole  "(PROTONIX) 20 MG EC tablet     polyethylene glycol 3350 POWD     Prenatal Vit-Fe Fumarate-FA (PRENATAL VITAMIN PO)     senna-docusate (SENOKOT-S/PERICOLACE) 8.6-50 MG tablet     valACYclovir (VALTREX) 500 MG tablet      No current facility-administered medications for this visit.         ROS:   12 point review of systems negative other than symptoms noted below or in the HPI.  Gastrointestinal: Constipation, Heartburn and Hemorrhoids        OBJECTIVE:      EXAM:  LMP 11/27/2022  There is no height or weight on file to calculate BMI.     Nurse phone visit completed. Prenatal book and folder (containing standard educational hand-outs and brochures) will be given at the next visit to patient. Information in folder reviewed over the phone. Questions answered. Brochure given on optional screening available to assess chromosomal anomalies.  NIPS completed. Pt advised to call the clinic if she has any questions or concerns related to her pregnancy. Prenatal labs future ordered. New prenatal visit scheduled on 7/12/23 with .              Lab Results   Component Value Date     PAP ASC-US 04/14/2021      PHQ-9 score:           No data to display                          No data to display                            Patient supplied answers from flow sheet for:  Prenatal OB Questionnaire.   patient to complete questionnaires   Nell Rodriguez RN          OBJECTIVE:     EXAM:  /60   Ht 1.626 m (5' 4\")   Wt 78 kg (172 lb)   LMP 11/27/2022   BMI 29.52 kg/m   Body mass index is 29.52 kg/m .    GENERAL: healthy, alert and no distress  EYES: Eyes grossly normal to inspection, PERRL and conjunctivae and sclerae normal  NECK: no adenopathy, no asymmetry, masses, or scars and thyroid normal to palpation  RESP: lungs clear to auscultation - no rales, rhonchi or wheezes  BREAST: normal without masses, tenderness or nipple discharge and no palpable axillary masses or adenopathy  CV: regular rate and rhythm, normal S1 " S2, no S3 or S4, no murmur, click or rub, no peripheral edema and peripheral pulses strong  ABDOMEN: soft, nontender, no hepatosplenomegaly, no masses and bowel sounds normal  MS: no gross musculoskeletal defects noted, no edema  SKIN: no suspicious lesions or rashes  NEURO: Normal strength and tone, mentation intact and speech normal  PSYCH: mentation appears normal, affect normal/bright    ASSESSMENT/PLAN:       ICD-10-CM    1. High-risk pregnancy, elderly multigravida in third trimester  O09.523 US OB Follow Up >14 Weeks     US OB Fetal Biophys Prf wo NST Singls W/Ltd     US OB Biophys Single Gestation Measure      2. Herpes simplex infection in pregnancy, third trimester  O98.513 valACYclovir (VALTREX) 500 MG tablet    B00.9       3. Hypothyroid in pregnancy, antepartum, third trimester  O99.283     E03.9           37 year old , On 2023 patient is 32w3d weeks of pregnancy with TAMANNA of 9/3/2023, by Last Menstrual Period        Counseling given:   - Follow up in 2 weeks for return OB visit.  - Recommended weight gain for pregnancy: 25-35 lbs.         PLAN/PATIENT INSTRUCTIONS:    Reviewed patient's outside records from [facility] and all normal first OB labs and NIPT other than carrier testing as above    Did not have hypothyroidism outside of pregnancy but was suboptimal TSH at time of IVF so started on 50mcg and stayed on it.  Will check TSH at 36 weeks to determine need for dose change or if can d/c completely after delivery depending on range. Last TSH was 1.6 on 23    Discussed physiology of prolonged sitting in pregnancy and uterine pressure on vena cava and vasovagal response and body's- mechanism to prevent syncope with increasing HR and very likely that is what she's having and the tachycardia is mimicking anxiety  Reviewed hydration, position changes, avoiding prolonged standing or sitting especially with crossed legs, compression socks if needed, etc.  If feels that having true anxiety then  will discuss further but as a psychiatrist w/history of depression, she is aware of her sx and denies that this is currently the issue.    Discussed recommendation to deliver at 39w once maternal age over 40 and reviewed FGR, placental insufficiency, meconium aspiration, fetal size as has tracked macrosomic so far and failed her 1 hr GCT, though passed the 3 hour completely. Also increasing risk of pre-e, etc  Patient is agreeable to this and will continue to discuss and make plans for this in the coming weeks.    B/C not aware in advance of age 38, didn't have a growth scan today so will scheduled growth at 34 weeks, BPP weekly starting at 36 until delivery and a growth/BPP at 37 weeks    Will send Rx for valtrex and do recommend she start daily at 35-36 weeks until delivery to minimize active lesions or asx viral shedding at time of delivery    Return in 2 weeks    Marlyn Baires MD

## 2023-07-18 PROBLEM — A60.00 GENITAL HERPES SIMPLEX, UNSPECIFIED SITE: Status: ACTIVE | Noted: 2023-07-18

## 2023-07-18 PROBLEM — E03.8 SUBCLINICAL HYPOTHYROIDISM: Status: ACTIVE | Noted: 2023-07-18

## 2023-07-18 RX ORDER — VALACYCLOVIR HYDROCHLORIDE 500 MG/1
500 TABLET, FILM COATED ORAL DAILY
Qty: 30 TABLET | Refills: 1 | Status: ON HOLD | OUTPATIENT
Start: 2023-07-18 | End: 2023-08-29

## 2023-07-24 ENCOUNTER — PRENATAL OFFICE VISIT (OUTPATIENT)
Dept: OBGYN | Facility: CLINIC | Age: 38
End: 2023-07-24
Payer: COMMERCIAL

## 2023-07-24 ENCOUNTER — ANCILLARY PROCEDURE (OUTPATIENT)
Dept: ULTRASOUND IMAGING | Facility: CLINIC | Age: 38
End: 2023-07-24
Payer: COMMERCIAL

## 2023-07-24 VITALS
BODY MASS INDEX: 29.6 KG/M2 | WEIGHT: 173.4 LBS | SYSTOLIC BLOOD PRESSURE: 100 MMHG | HEIGHT: 64 IN | DIASTOLIC BLOOD PRESSURE: 70 MMHG

## 2023-07-24 DIAGNOSIS — O09.523 HIGH-RISK PREGNANCY, ELDERLY MULTIGRAVIDA IN THIRD TRIMESTER: Primary | ICD-10-CM

## 2023-07-24 DIAGNOSIS — O09.523 HIGH-RISK PREGNANCY, ELDERLY MULTIGRAVIDA IN THIRD TRIMESTER: ICD-10-CM

## 2023-07-24 DIAGNOSIS — E03.9 HYPOTHYROID IN PREGNANCY, ANTEPARTUM, THIRD TRIMESTER: ICD-10-CM

## 2023-07-24 DIAGNOSIS — O98.513 HERPES SIMPLEX INFECTION IN PREGNANCY, THIRD TRIMESTER: ICD-10-CM

## 2023-07-24 DIAGNOSIS — O99.283 HYPOTHYROID IN PREGNANCY, ANTEPARTUM, THIRD TRIMESTER: ICD-10-CM

## 2023-07-24 DIAGNOSIS — B00.9 HERPES SIMPLEX INFECTION IN PREGNANCY, THIRD TRIMESTER: ICD-10-CM

## 2023-07-24 PROCEDURE — 76816 OB US FOLLOW-UP PER FETUS: CPT | Performed by: OBSTETRICS & GYNECOLOGY

## 2023-07-24 PROCEDURE — 99207 PR PRENATAL VISIT: CPT | Performed by: OBSTETRICS & GYNECOLOGY

## 2023-07-24 NOTE — PROGRESS NOTES
PD 9.00 cm 36w3d 95.5%   HC 33.15 cm 37w6d 93.4%   AC 32.99 cm 36w6d >97.7%   FL 6.34 cm 32w5d 11.2%   EFW (lbs/oz) 6 lbs               3ozs       EFW (g) 2802 g 89.1%        Fetal heart rate: 131 bpm  Fetal presentation: Cephalic  Amniotic fluid: 5.00cm MVP  Patient had a growth scan today due to age 38 at time of delivery and with late tx of care  EFW is LGA but proportionaltely with BPD and HC and tracking as her first did.   is about Shelbi height and neither were big babies.   Maryanne failed her 1hr GCT but then passed all 4 values of her GTT and no poly with normal MVP today  Discussed that already had nearly 9# baby and 4hrs pushing that this time with IOL at 39 weeks this baby will inherently be likely smaller and tend to be overestimated EFW on 3rd tri U/S anyway so not concerned for macrosomia  Had stressful week at work and started to get HSV sx.  Got her valtrex and did that BID for the last few days so may end up running out early on the daily suppression until delivery b/c using the current Rx faster. Refills to be placed  Will check TSH in 2 weeks and either refill levox or consider d/c after delivery since only on it during pregnancy for subclinical/IVF  Also discussed adding PPI prilosec to famotidine as latter not completely taking care of sx.  Tried protonix earlier on and felt like not as helpful as famotidine so stopped it  Likely will help more to do PPI now so will try otc prilosec 20mg  Return 2 weeks and start weekly BPPs and do GBS/Cvx check at that time

## 2023-08-11 ENCOUNTER — PRENATAL OFFICE VISIT (OUTPATIENT)
Dept: OBGYN | Facility: CLINIC | Age: 38
End: 2023-08-11
Payer: COMMERCIAL

## 2023-08-11 ENCOUNTER — ANCILLARY PROCEDURE (OUTPATIENT)
Dept: ULTRASOUND IMAGING | Facility: CLINIC | Age: 38
End: 2023-08-11
Payer: COMMERCIAL

## 2023-08-11 VITALS
SYSTOLIC BLOOD PRESSURE: 110 MMHG | BODY MASS INDEX: 29.98 KG/M2 | DIASTOLIC BLOOD PRESSURE: 70 MMHG | WEIGHT: 175.6 LBS | HEIGHT: 64 IN

## 2023-08-11 DIAGNOSIS — B00.9 HERPES SIMPLEX INFECTION IN PREGNANCY, THIRD TRIMESTER: ICD-10-CM

## 2023-08-11 DIAGNOSIS — O99.283 HYPOTHYROID IN PREGNANCY, ANTEPARTUM, THIRD TRIMESTER: ICD-10-CM

## 2023-08-11 DIAGNOSIS — O09.523 HIGH-RISK PREGNANCY, ELDERLY MULTIGRAVIDA IN THIRD TRIMESTER: ICD-10-CM

## 2023-08-11 DIAGNOSIS — E03.9 HYPOTHYROID IN PREGNANCY, ANTEPARTUM, THIRD TRIMESTER: ICD-10-CM

## 2023-08-11 DIAGNOSIS — O09.523 HIGH-RISK PREGNANCY, ELDERLY MULTIGRAVIDA IN THIRD TRIMESTER: Primary | ICD-10-CM

## 2023-08-11 DIAGNOSIS — Z36.85 SCREENING, ANTENATAL, FOR STREPTOCOCCUS B: ICD-10-CM

## 2023-08-11 DIAGNOSIS — O98.513 HERPES SIMPLEX INFECTION IN PREGNANCY, THIRD TRIMESTER: ICD-10-CM

## 2023-08-11 PROCEDURE — 87653 STREP B DNA AMP PROBE: CPT | Performed by: OBSTETRICS & GYNECOLOGY

## 2023-08-11 PROCEDURE — 76819 FETAL BIOPHYS PROFIL W/O NST: CPT | Performed by: OBSTETRICS & GYNECOLOGY

## 2023-08-11 PROCEDURE — 99207 PR PRENATAL VISIT: CPT | Performed by: OBSTETRICS & GYNECOLOGY

## 2023-08-11 NOTE — PROGRESS NOTES
Had BPP due to age of 38 at time of delivery  MVP was 5.6 cm, cephalic and normal FHTs and 8/8    GBS testing done today and reviewed tx plan if positive. Did not have it with her first.   Cervix is 1/30-40%/-3    Affirms BH, back pain, and edema. Denies HA or vision changes.     Discussed IOL on  8/28/23 as the earliest she can be induced as will be 39+1 that day but do recommend it given maternal age and LGA assumption and patient is very much in agreement.  Cervix now is close to favorable enough to not need ripening but will reassess once more next week and then schedule from there  Appropriate weight gain, normal BPs, feeling tons of FM all the time still.  Is doing her daily suppression valtrex and last outbreak was just after 32 weeks.  Return in 1 week.

## 2023-08-12 LAB — GP B STREP DNA SPEC QL NAA+PROBE: NEGATIVE

## 2023-08-18 ENCOUNTER — PRENATAL OFFICE VISIT (OUTPATIENT)
Dept: OBGYN | Facility: CLINIC | Age: 38
End: 2023-08-18
Payer: COMMERCIAL

## 2023-08-18 ENCOUNTER — ANCILLARY PROCEDURE (OUTPATIENT)
Dept: ULTRASOUND IMAGING | Facility: CLINIC | Age: 38
End: 2023-08-18
Payer: COMMERCIAL

## 2023-08-18 VITALS
BODY MASS INDEX: 30.52 KG/M2 | DIASTOLIC BLOOD PRESSURE: 66 MMHG | SYSTOLIC BLOOD PRESSURE: 110 MMHG | WEIGHT: 178.8 LBS | HEIGHT: 64 IN

## 2023-08-18 DIAGNOSIS — O09.523 HIGH-RISK PREGNANCY, ELDERLY MULTIGRAVIDA IN THIRD TRIMESTER: Primary | ICD-10-CM

## 2023-08-18 DIAGNOSIS — A60.09 GENITAL HERPES COMPLICATING PREGNANCY IN THIRD TRIMESTER: ICD-10-CM

## 2023-08-18 DIAGNOSIS — O09.523 HIGH-RISK PREGNANCY, ELDERLY MULTIGRAVIDA IN THIRD TRIMESTER: ICD-10-CM

## 2023-08-18 DIAGNOSIS — O98.313 GENITAL HERPES COMPLICATING PREGNANCY IN THIRD TRIMESTER: ICD-10-CM

## 2023-08-18 DIAGNOSIS — O99.283 HYPOTHYROID IN PREGNANCY, ANTEPARTUM, THIRD TRIMESTER: ICD-10-CM

## 2023-08-18 DIAGNOSIS — E03.9 HYPOTHYROID IN PREGNANCY, ANTEPARTUM, THIRD TRIMESTER: ICD-10-CM

## 2023-08-18 LAB — TSH SERPL DL<=0.005 MIU/L-ACNC: 1.63 UIU/ML (ref 0.3–4.2)

## 2023-08-18 PROCEDURE — 36415 COLL VENOUS BLD VENIPUNCTURE: CPT | Performed by: OBSTETRICS & GYNECOLOGY

## 2023-08-18 PROCEDURE — 76819 FETAL BIOPHYS PROFIL W/O NST: CPT | Performed by: OBSTETRICS & GYNECOLOGY

## 2023-08-18 PROCEDURE — 84443 ASSAY THYROID STIM HORMONE: CPT | Performed by: OBSTETRICS & GYNECOLOGY

## 2023-08-18 PROCEDURE — 99207 PR PRENATAL VISIT: CPT | Performed by: OBSTETRICS & GYNECOLOGY

## 2023-08-18 PROCEDURE — 76816 OB US FOLLOW-UP PER FETUS: CPT | Performed by: OBSTETRICS & GYNECOLOGY

## 2023-08-18 NOTE — PROGRESS NOTES
Pt presents for her 35 OB visit.     GBS(-) status from testing last week discussed.  Affirmed some edema especially end of a work day, end of the week, but not severe   denied HA and vision changes or any pre-e sx and BPs are normal and .   Weight gain appropriate.  Growth and BPP done today due to maternal age of 38.  EFW is 8-3# or the 90% with the AC at >98%ile.  Tracking ahead of katheryn who was nearly 9# but at almost 42 weeks.  Plotting out to be about her size at 39 weeks but planned IOL at 39 weeks should hopefully preclude macrosomia more than she has already managed with her first but risks of that, CPD, dystocia were addressed.  BPP was 8/8 with normal MVP and cepahlic as well.   Has some house projects going on so needs to figure out timing for those and her IOL and how to manage that but plan currently is 8/28 IOL    Repeat TSH today and determine if needs a dose change or if close to overcorrected, and now nearly ready to delivery, if maybe can stop it all together since never more than for IVF optimizing of TSH and not truly hypothyroid.  If normal range then would cont at same dose 6 weeks PP and then recheck TSH at that time.  refill Levothyrox if appropriate once results are back     Sent 2 separate Valtrex rx at last visit, one that was for her active outbreak right after our first visit but also her daily suppression dose so should have refills at pharmacy to keep doing suppression until delivery  IOL details discussed and scheduled for 8/28 given cervix is 2+cm and favorable for pitocin/AROM

## 2023-08-20 RX ORDER — LEVOTHYROXINE SODIUM 50 UG/1
50 TABLET ORAL DAILY
Qty: 90 TABLET | Refills: 0 | Status: SHIPPED | OUTPATIENT
Start: 2023-08-20 | End: 2023-11-06

## 2023-08-20 NOTE — RESULT ENCOUNTER NOTE
Maryanne,    Your TSH looks great, so I will refill your levothyroxine at the 50mcg dose.    I think you could probably go either way for after you deliver. You could stop it after and we will just recheck your TSH at your 6 week check up and see where we're at, or you are at a TSH range that is very middle of the road optimized, so you could stay on it and then recheck a TSH at 6 weeks and potentially end up stopping it then if you are overcorrected at that point.     I'll refill it for 3 months just so you have both options.    Marlyn Baires MD

## 2023-08-23 ENCOUNTER — PRENATAL OFFICE VISIT (OUTPATIENT)
Dept: OBGYN | Facility: CLINIC | Age: 38
End: 2023-08-23
Payer: COMMERCIAL

## 2023-08-23 ENCOUNTER — ANCILLARY PROCEDURE (OUTPATIENT)
Dept: ULTRASOUND IMAGING | Facility: CLINIC | Age: 38
End: 2023-08-23
Payer: COMMERCIAL

## 2023-08-23 ENCOUNTER — DOCUMENTATION ONLY (OUTPATIENT)
Dept: OBGYN | Facility: CLINIC | Age: 38
End: 2023-08-23

## 2023-08-23 VITALS — BODY MASS INDEX: 30.55 KG/M2 | WEIGHT: 178 LBS | DIASTOLIC BLOOD PRESSURE: 74 MMHG | SYSTOLIC BLOOD PRESSURE: 112 MMHG

## 2023-08-23 DIAGNOSIS — E03.9 HYPOTHYROID IN PREGNANCY, ANTEPARTUM, THIRD TRIMESTER: ICD-10-CM

## 2023-08-23 DIAGNOSIS — O99.283 HYPOTHYROID IN PREGNANCY, ANTEPARTUM, THIRD TRIMESTER: ICD-10-CM

## 2023-08-23 DIAGNOSIS — B00.9 HSV-2 (HERPES SIMPLEX VIRUS 2) INFECTION: ICD-10-CM

## 2023-08-23 DIAGNOSIS — O09.523 HIGH-RISK PREGNANCY, ELDERLY MULTIGRAVIDA IN THIRD TRIMESTER: Primary | ICD-10-CM

## 2023-08-23 PROCEDURE — 59425 ANTEPARTUM CARE ONLY: CPT | Performed by: OBSTETRICS & GYNECOLOGY

## 2023-08-23 PROCEDURE — 99207 PR PRENATAL VISIT: CPT | Performed by: OBSTETRICS & GYNECOLOGY

## 2023-08-23 PROCEDURE — 76819 FETAL BIOPHYS PROFIL W/O NST: CPT | Performed by: OBSTETRICS & GYNECOLOGY

## 2023-08-23 RX ORDER — OXYTOCIN 10 [USP'U]/ML
10 INJECTION, SOLUTION INTRAMUSCULAR; INTRAVENOUS
Status: CANCELLED | OUTPATIENT
Start: 2023-08-23

## 2023-08-23 RX ORDER — SODIUM CHLORIDE, SODIUM LACTATE, POTASSIUM CHLORIDE, CALCIUM CHLORIDE 600; 310; 30; 20 MG/100ML; MG/100ML; MG/100ML; MG/100ML
INJECTION, SOLUTION INTRAVENOUS CONTINUOUS PRN
Status: CANCELLED | OUTPATIENT
Start: 2023-08-23

## 2023-08-23 RX ORDER — METHYLERGONOVINE MALEATE 0.2 MG/ML
200 INJECTION INTRAVENOUS
Status: CANCELLED | OUTPATIENT
Start: 2023-08-23

## 2023-08-23 RX ORDER — ONDANSETRON 4 MG/1
4 TABLET, ORALLY DISINTEGRATING ORAL EVERY 6 HOURS PRN
Status: CANCELLED | OUTPATIENT
Start: 2023-08-23

## 2023-08-23 RX ORDER — OXYTOCIN/0.9 % SODIUM CHLORIDE 30/500 ML
100-340 PLASTIC BAG, INJECTION (ML) INTRAVENOUS CONTINUOUS PRN
Status: CANCELLED | OUTPATIENT
Start: 2023-08-23

## 2023-08-23 RX ORDER — CARBOPROST TROMETHAMINE 250 UG/ML
250 INJECTION, SOLUTION INTRAMUSCULAR
Status: CANCELLED | OUTPATIENT
Start: 2023-08-23

## 2023-08-23 RX ORDER — LIDOCAINE 40 MG/G
CREAM TOPICAL
Status: CANCELLED | OUTPATIENT
Start: 2023-08-23

## 2023-08-23 RX ORDER — PROCHLORPERAZINE MALEATE 5 MG
10 TABLET ORAL EVERY 6 HOURS PRN
Status: CANCELLED | OUTPATIENT
Start: 2023-08-23

## 2023-08-23 RX ORDER — MISOPROSTOL 200 UG/1
800 TABLET ORAL
Status: CANCELLED | OUTPATIENT
Start: 2023-08-23

## 2023-08-23 RX ORDER — METOCLOPRAMIDE HYDROCHLORIDE 5 MG/ML
10 INJECTION INTRAMUSCULAR; INTRAVENOUS EVERY 6 HOURS PRN
Status: CANCELLED | OUTPATIENT
Start: 2023-08-23

## 2023-08-23 RX ORDER — ONDANSETRON 2 MG/ML
4 INJECTION INTRAMUSCULAR; INTRAVENOUS EVERY 6 HOURS PRN
Status: CANCELLED | OUTPATIENT
Start: 2023-08-23

## 2023-08-23 RX ORDER — KETOROLAC TROMETHAMINE 30 MG/ML
30 INJECTION, SOLUTION INTRAMUSCULAR; INTRAVENOUS
Status: CANCELLED | OUTPATIENT
Start: 2023-08-23 | End: 2023-08-28

## 2023-08-23 RX ORDER — OXYTOCIN/0.9 % SODIUM CHLORIDE 30/500 ML
340 PLASTIC BAG, INJECTION (ML) INTRAVENOUS CONTINUOUS PRN
Status: CANCELLED | OUTPATIENT
Start: 2023-08-23

## 2023-08-23 RX ORDER — MISOPROSTOL 100 UG/1
400 TABLET ORAL
Status: CANCELLED | OUTPATIENT
Start: 2023-08-23

## 2023-08-23 RX ORDER — NALOXONE HYDROCHLORIDE 0.4 MG/ML
0.2 INJECTION, SOLUTION INTRAMUSCULAR; INTRAVENOUS; SUBCUTANEOUS
Status: CANCELLED | OUTPATIENT
Start: 2023-08-23

## 2023-08-23 RX ORDER — METOCLOPRAMIDE 5 MG/1
10 TABLET ORAL EVERY 6 HOURS PRN
Status: CANCELLED | OUTPATIENT
Start: 2023-08-23

## 2023-08-23 RX ORDER — OXYTOCIN/0.9 % SODIUM CHLORIDE 30/500 ML
1-24 PLASTIC BAG, INJECTION (ML) INTRAVENOUS CONTINUOUS
Status: CANCELLED | OUTPATIENT
Start: 2023-08-23

## 2023-08-23 RX ORDER — ACETAMINOPHEN 325 MG/1
975 TABLET ORAL EVERY 6 HOURS PRN
Status: CANCELLED | OUTPATIENT
Start: 2023-08-23

## 2023-08-23 RX ORDER — CITRIC ACID/SODIUM CITRATE 334-500MG
30 SOLUTION, ORAL ORAL
Status: CANCELLED | OUTPATIENT
Start: 2023-08-23

## 2023-08-23 RX ORDER — NALOXONE HYDROCHLORIDE 0.4 MG/ML
0.4 INJECTION, SOLUTION INTRAMUSCULAR; INTRAVENOUS; SUBCUTANEOUS
Status: CANCELLED | OUTPATIENT
Start: 2023-08-23

## 2023-08-23 RX ORDER — PROCHLORPERAZINE 25 MG
25 SUPPOSITORY, RECTAL RECTAL EVERY 12 HOURS PRN
Status: CANCELLED | OUTPATIENT
Start: 2023-08-23

## 2023-08-23 RX ORDER — IBUPROFEN 200 MG
800 TABLET ORAL
Status: CANCELLED | OUTPATIENT
Start: 2023-08-23 | End: 2023-08-28

## 2023-08-23 NOTE — PROGRESS NOTES
Type of Paperwork received:  FMLA     Date Rcvd:  8/23/23    Rcvd From (Company name): [employer] fax [phone]     Provider:  Dr Baires    Placed on Provider Cart Date:  8/23/23

## 2023-08-23 NOTE — PROGRESS NOTES
Pt presents for her 38w OB visit.     IOL scheduled for 8/28 which is 39+1 given she will be 38 yrs old as of this Friday  Had a lot of IOL/ripening with her first and had a lot of pain before getting an epidural so discussed timing of one with a planned IOL with multiparity and how early she can get one so as not to have any discomfort., or very minimal  BPP done due to maternal age and 8/8 with MVP of 7.7  Almost to level of MVP that prompts full BRANDON for poly but just short of that  She had a severe HA yesterday that came on really acutely when struggling to get comfortable in bed from bad reflux.  Took tylenol and HA resolved after 30mins and has not recurred.  No edema, no RUQ pain, no vision changes.  Normal BP.  Is taking prilosec but now is having reflux despite that even from just some water.  Can do BID prilosec or 40mg at once for this last week to try to help reflux, but may need to just prop herself up on pillows to sleep until delivery.  Never had BH with her first but yesterday she had maybe 5 of them them with pelvic/vaginal pressure.  No change in disharge/LOF or VB.  Had a few more BH today as well.   CVX was 2+/60/-2/soft and anterior    Her  will come tomorrow, so that the painting will be done by Monday so then not bringing the baby home to actively painted or recently painted house and it will be done before they would be coming home  IOL scheduled for next Monday and all info discussed  Fill out her [place] paperwork as well.

## 2023-08-28 ENCOUNTER — ANESTHESIA EVENT (OUTPATIENT)
Dept: OBGYN | Facility: CLINIC | Age: 38
End: 2023-08-28
Payer: COMMERCIAL

## 2023-08-28 ENCOUNTER — ANESTHESIA (OUTPATIENT)
Dept: OBGYN | Facility: CLINIC | Age: 38
End: 2023-08-28
Payer: COMMERCIAL

## 2023-08-28 ENCOUNTER — HOSPITAL ENCOUNTER (INPATIENT)
Facility: CLINIC | Age: 38
LOS: 2 days | Discharge: HOME | End: 2023-08-30
Attending: OBSTETRICS & GYNECOLOGY | Admitting: OBSTETRICS & GYNECOLOGY
Payer: COMMERCIAL

## 2023-08-28 DIAGNOSIS — O09.523 HIGH-RISK PREGNANCY, ELDERLY MULTIGRAVIDA IN THIRD TRIMESTER: ICD-10-CM

## 2023-08-28 DIAGNOSIS — L23.1 ALLERGIC CONTACT DERMATITIS DUE TO ADHESIVES: ICD-10-CM

## 2023-08-28 PROBLEM — O99.283 HYPOTHYROID IN PREGNANCY, ANTEPARTUM, THIRD TRIMESTER: Status: ACTIVE | Noted: 2023-08-28

## 2023-08-28 PROBLEM — E03.9 HYPOTHYROID IN PREGNANCY, ANTEPARTUM, THIRD TRIMESTER: Status: ACTIVE | Noted: 2023-08-28

## 2023-08-28 PROBLEM — B00.9 HSV-2 (HERPES SIMPLEX VIRUS 2) INFECTION: Status: ACTIVE | Noted: 2023-08-28

## 2023-08-28 LAB
ABO/RH(D): NORMAL
ANTIBODY SCREEN: NEGATIVE
ERYTHROCYTE [DISTWIDTH] IN BLOOD BY AUTOMATED COUNT: 12.7 % (ref 10–15)
HCT VFR BLD AUTO: 35.2 % (ref 35–47)
HGB BLD-MCNC: 12.6 G/DL (ref 11.7–15.7)
MCH RBC QN AUTO: 33.2 PG (ref 26.5–33)
MCHC RBC AUTO-ENTMCNC: 35.8 G/DL (ref 31.5–36.5)
MCV RBC AUTO: 93 FL (ref 78–100)
PLATELET # BLD AUTO: 142 10E3/UL (ref 150–450)
RBC # BLD AUTO: 3.79 10E6/UL (ref 3.8–5.2)
SPECIMEN EXPIRATION DATE: NORMAL
T PALLIDUM AB SER QL: NONREACTIVE
WBC # BLD AUTO: 6.2 10E3/UL (ref 4–11)

## 2023-08-28 PROCEDURE — 258N000003 HC RX IP 258 OP 636: Performed by: OBSTETRICS & GYNECOLOGY

## 2023-08-28 PROCEDURE — 59410 OBSTETRICAL CARE: CPT | Performed by: OBSTETRICS & GYNECOLOGY

## 2023-08-28 PROCEDURE — 250N000011 HC RX IP 250 OP 636: Mod: JZ | Performed by: ANESTHESIOLOGY

## 2023-08-28 PROCEDURE — 10907ZC DRAINAGE OF AMNIOTIC FLUID, THERAPEUTIC FROM PRODUCTS OF CONCEPTION, VIA NATURAL OR ARTIFICIAL OPENING: ICD-10-PCS | Performed by: OBSTETRICS & GYNECOLOGY

## 2023-08-28 PROCEDURE — 36415 COLL VENOUS BLD VENIPUNCTURE: CPT | Performed by: OBSTETRICS & GYNECOLOGY

## 2023-08-28 PROCEDURE — 00HU33Z INSERTION OF INFUSION DEVICE INTO SPINAL CANAL, PERCUTANEOUS APPROACH: ICD-10-PCS | Performed by: ANESTHESIOLOGY

## 2023-08-28 PROCEDURE — 120N000012 HC R&B POSTPARTUM

## 2023-08-28 PROCEDURE — 3E0R3BZ INTRODUCTION OF ANESTHETIC AGENT INTO SPINAL CANAL, PERCUTANEOUS APPROACH: ICD-10-PCS | Performed by: ANESTHESIOLOGY

## 2023-08-28 PROCEDURE — 86780 TREPONEMA PALLIDUM: CPT | Performed by: OBSTETRICS & GYNECOLOGY

## 2023-08-28 PROCEDURE — 250N000013 HC RX MED GY IP 250 OP 250 PS 637: Performed by: OBSTETRICS & GYNECOLOGY

## 2023-08-28 PROCEDURE — 86850 RBC ANTIBODY SCREEN: CPT | Performed by: OBSTETRICS & GYNECOLOGY

## 2023-08-28 PROCEDURE — 0HQ9XZZ REPAIR PERINEUM SKIN, EXTERNAL APPROACH: ICD-10-PCS | Performed by: OBSTETRICS & GYNECOLOGY

## 2023-08-28 PROCEDURE — 250N000009 HC RX 250: Performed by: OBSTETRICS & GYNECOLOGY

## 2023-08-28 PROCEDURE — 722N000001 HC LABOR CARE VAGINAL DELIVERY SINGLE

## 2023-08-28 PROCEDURE — 250N000011 HC RX IP 250 OP 636: Performed by: ANESTHESIOLOGY

## 2023-08-28 PROCEDURE — 85027 COMPLETE CBC AUTOMATED: CPT | Performed by: OBSTETRICS & GYNECOLOGY

## 2023-08-28 PROCEDURE — 370N000003 HC ANESTHESIA WARD SERVICE: Performed by: ANESTHESIOLOGY

## 2023-08-28 RX ORDER — KETOROLAC TROMETHAMINE 30 MG/ML
30 INJECTION, SOLUTION INTRAMUSCULAR; INTRAVENOUS
Status: DISCONTINUED | OUTPATIENT
Start: 2023-08-28 | End: 2023-08-28

## 2023-08-28 RX ORDER — MISOPROSTOL 200 UG/1
400 TABLET ORAL
Status: DISCONTINUED | OUTPATIENT
Start: 2023-08-28 | End: 2023-08-30 | Stop reason: HOSPADM

## 2023-08-28 RX ORDER — OXYTOCIN 10 [USP'U]/ML
10 INJECTION, SOLUTION INTRAMUSCULAR; INTRAVENOUS
Status: DISCONTINUED | OUTPATIENT
Start: 2023-08-28 | End: 2023-08-28

## 2023-08-28 RX ORDER — MISOPROSTOL 200 UG/1
400 TABLET ORAL
Status: DISCONTINUED | OUTPATIENT
Start: 2023-08-28 | End: 2023-08-28

## 2023-08-28 RX ORDER — METOCLOPRAMIDE 10 MG/1
10 TABLET ORAL EVERY 6 HOURS PRN
Status: DISCONTINUED | OUTPATIENT
Start: 2023-08-28 | End: 2023-08-28

## 2023-08-28 RX ORDER — METOCLOPRAMIDE HYDROCHLORIDE 5 MG/ML
10 INJECTION INTRAMUSCULAR; INTRAVENOUS EVERY 6 HOURS PRN
Status: DISCONTINUED | OUTPATIENT
Start: 2023-08-28 | End: 2023-08-28

## 2023-08-28 RX ORDER — CITRIC ACID/SODIUM CITRATE 334-500MG
30 SOLUTION, ORAL ORAL
Status: DISCONTINUED | OUTPATIENT
Start: 2023-08-28 | End: 2023-08-28

## 2023-08-28 RX ORDER — OXYTOCIN/0.9 % SODIUM CHLORIDE 30/500 ML
100-340 PLASTIC BAG, INJECTION (ML) INTRAVENOUS CONTINUOUS PRN
Status: DISCONTINUED | OUTPATIENT
Start: 2023-08-28 | End: 2023-08-28

## 2023-08-28 RX ORDER — IBUPROFEN 400 MG/1
800 TABLET, FILM COATED ORAL EVERY 6 HOURS PRN
Status: DISCONTINUED | OUTPATIENT
Start: 2023-08-28 | End: 2023-08-30 | Stop reason: HOSPADM

## 2023-08-28 RX ORDER — MISOPROSTOL 200 UG/1
800 TABLET ORAL
Status: DISCONTINUED | OUTPATIENT
Start: 2023-08-28 | End: 2023-08-30 | Stop reason: HOSPADM

## 2023-08-28 RX ORDER — DOCUSATE SODIUM 100 MG/1
100 CAPSULE, LIQUID FILLED ORAL DAILY
Status: DISCONTINUED | OUTPATIENT
Start: 2023-08-28 | End: 2023-08-30 | Stop reason: HOSPADM

## 2023-08-28 RX ORDER — OXYTOCIN/0.9 % SODIUM CHLORIDE 30/500 ML
1-24 PLASTIC BAG, INJECTION (ML) INTRAVENOUS CONTINUOUS
Status: DISCONTINUED | OUTPATIENT
Start: 2023-08-28 | End: 2023-08-28

## 2023-08-28 RX ORDER — MODIFIED LANOLIN
OINTMENT (GRAM) TOPICAL
Status: DISCONTINUED | OUTPATIENT
Start: 2023-08-28 | End: 2023-08-30 | Stop reason: HOSPADM

## 2023-08-28 RX ORDER — HYDROCORTISONE 25 MG/G
CREAM TOPICAL 3 TIMES DAILY PRN
Status: DISCONTINUED | OUTPATIENT
Start: 2023-08-28 | End: 2023-08-30 | Stop reason: HOSPADM

## 2023-08-28 RX ORDER — MISOPROSTOL 200 UG/1
800 TABLET ORAL
Status: DISCONTINUED | OUTPATIENT
Start: 2023-08-28 | End: 2023-08-28

## 2023-08-28 RX ORDER — FENTANYL CITRATE 50 UG/ML
100 INJECTION, SOLUTION INTRAMUSCULAR; INTRAVENOUS ONCE
Status: DISCONTINUED | OUTPATIENT
Start: 2023-08-28 | End: 2023-08-28

## 2023-08-28 RX ORDER — ONDANSETRON 2 MG/ML
4 INJECTION INTRAMUSCULAR; INTRAVENOUS EVERY 6 HOURS PRN
Status: DISCONTINUED | OUTPATIENT
Start: 2023-08-28 | End: 2023-08-28

## 2023-08-28 RX ORDER — BISACODYL 10 MG
10 SUPPOSITORY, RECTAL RECTAL DAILY PRN
Status: DISCONTINUED | OUTPATIENT
Start: 2023-08-28 | End: 2023-08-30 | Stop reason: HOSPADM

## 2023-08-28 RX ORDER — ACETAMINOPHEN 325 MG/1
975 TABLET ORAL EVERY 6 HOURS PRN
Status: DISCONTINUED | OUTPATIENT
Start: 2023-08-28 | End: 2023-08-30 | Stop reason: HOSPADM

## 2023-08-28 RX ORDER — OXYTOCIN/0.9 % SODIUM CHLORIDE 30/500 ML
340 PLASTIC BAG, INJECTION (ML) INTRAVENOUS CONTINUOUS PRN
Status: DISCONTINUED | OUTPATIENT
Start: 2023-08-28 | End: 2023-08-30 | Stop reason: HOSPADM

## 2023-08-28 RX ORDER — FENTANYL CITRATE 50 UG/ML
INJECTION, SOLUTION INTRAMUSCULAR; INTRAVENOUS
Status: COMPLETED | OUTPATIENT
Start: 2023-08-28 | End: 2023-08-28

## 2023-08-28 RX ORDER — ROPIVACAINE HYDROCHLORIDE 2 MG/ML
10 INJECTION, SOLUTION EPIDURAL; INFILTRATION; PERINEURAL ONCE
Status: DISCONTINUED | OUTPATIENT
Start: 2023-08-28 | End: 2023-08-28

## 2023-08-28 RX ORDER — LEVOTHYROXINE SODIUM 50 UG/1
50 TABLET ORAL
Status: DISCONTINUED | OUTPATIENT
Start: 2023-08-29 | End: 2023-08-30 | Stop reason: HOSPADM

## 2023-08-28 RX ORDER — POLYETHYLENE GLYCOL 3350 17 G/17G
17 POWDER, FOR SOLUTION ORAL DAILY
Status: DISCONTINUED | OUTPATIENT
Start: 2023-08-28 | End: 2023-08-30 | Stop reason: HOSPADM

## 2023-08-28 RX ORDER — SODIUM CHLORIDE, SODIUM LACTATE, POTASSIUM CHLORIDE, CALCIUM CHLORIDE 600; 310; 30; 20 MG/100ML; MG/100ML; MG/100ML; MG/100ML
INJECTION, SOLUTION INTRAVENOUS CONTINUOUS PRN
Status: DISCONTINUED | OUTPATIENT
Start: 2023-08-28 | End: 2023-08-28

## 2023-08-28 RX ORDER — OXYTOCIN/0.9 % SODIUM CHLORIDE 30/500 ML
340 PLASTIC BAG, INJECTION (ML) INTRAVENOUS CONTINUOUS PRN
Status: DISCONTINUED | OUTPATIENT
Start: 2023-08-28 | End: 2023-08-28

## 2023-08-28 RX ORDER — EPHEDRINE SULFATE 50 MG/ML
5 INJECTION, SOLUTION INTRAMUSCULAR; INTRAVENOUS; SUBCUTANEOUS
Status: DISCONTINUED | OUTPATIENT
Start: 2023-08-28 | End: 2023-08-28

## 2023-08-28 RX ORDER — TRANEXAMIC ACID 10 MG/ML
1 INJECTION, SOLUTION INTRAVENOUS EVERY 30 MIN PRN
Status: DISCONTINUED | OUTPATIENT
Start: 2023-08-28 | End: 2023-08-28

## 2023-08-28 RX ORDER — PROCHLORPERAZINE 25 MG
25 SUPPOSITORY, RECTAL RECTAL EVERY 12 HOURS PRN
Status: DISCONTINUED | OUTPATIENT
Start: 2023-08-28 | End: 2023-08-28

## 2023-08-28 RX ORDER — CARBOPROST TROMETHAMINE 250 UG/ML
250 INJECTION, SOLUTION INTRAMUSCULAR
Status: DISCONTINUED | OUTPATIENT
Start: 2023-08-28 | End: 2023-08-28

## 2023-08-28 RX ORDER — NALOXONE HYDROCHLORIDE 0.4 MG/ML
0.2 INJECTION, SOLUTION INTRAMUSCULAR; INTRAVENOUS; SUBCUTANEOUS
Status: DISCONTINUED | OUTPATIENT
Start: 2023-08-28 | End: 2023-08-28

## 2023-08-28 RX ORDER — TRANEXAMIC ACID 10 MG/ML
1 INJECTION, SOLUTION INTRAVENOUS EVERY 30 MIN PRN
Status: DISCONTINUED | OUTPATIENT
Start: 2023-08-28 | End: 2023-08-30 | Stop reason: HOSPADM

## 2023-08-28 RX ORDER — CARBOPROST TROMETHAMINE 250 UG/ML
250 INJECTION, SOLUTION INTRAMUSCULAR
Status: DISCONTINUED | OUTPATIENT
Start: 2023-08-28 | End: 2023-08-30 | Stop reason: HOSPADM

## 2023-08-28 RX ORDER — PROCHLORPERAZINE MALEATE 5 MG
10 TABLET ORAL EVERY 6 HOURS PRN
Status: DISCONTINUED | OUTPATIENT
Start: 2023-08-28 | End: 2023-08-28

## 2023-08-28 RX ORDER — IBUPROFEN 400 MG/1
800 TABLET, FILM COATED ORAL
Status: DISCONTINUED | OUTPATIENT
Start: 2023-08-28 | End: 2023-08-28

## 2023-08-28 RX ORDER — LIDOCAINE 40 MG/G
CREAM TOPICAL
Status: DISCONTINUED | OUTPATIENT
Start: 2023-08-28 | End: 2023-08-28

## 2023-08-28 RX ORDER — METHYLERGONOVINE MALEATE 0.2 MG/ML
200 INJECTION INTRAVENOUS
Status: DISCONTINUED | OUTPATIENT
Start: 2023-08-28 | End: 2023-08-28

## 2023-08-28 RX ORDER — ONDANSETRON 4 MG/1
4 TABLET, ORALLY DISINTEGRATING ORAL EVERY 6 HOURS PRN
Status: DISCONTINUED | OUTPATIENT
Start: 2023-08-28 | End: 2023-08-28

## 2023-08-28 RX ORDER — PRENATAL VIT/IRON FUM/FOLIC AC 27MG-0.8MG
1 TABLET ORAL DAILY
Status: DISCONTINUED | OUTPATIENT
Start: 2023-08-29 | End: 2023-08-30 | Stop reason: HOSPADM

## 2023-08-28 RX ORDER — ROPIVACAINE HYDROCHLORIDE 2 MG/ML
INJECTION, SOLUTION EPIDURAL; INFILTRATION; PERINEURAL
Status: COMPLETED | OUTPATIENT
Start: 2023-08-28 | End: 2023-08-28

## 2023-08-28 RX ORDER — OXYTOCIN 10 [USP'U]/ML
10 INJECTION, SOLUTION INTRAMUSCULAR; INTRAVENOUS
Status: DISCONTINUED | OUTPATIENT
Start: 2023-08-28 | End: 2023-08-30 | Stop reason: HOSPADM

## 2023-08-28 RX ORDER — NALBUPHINE HYDROCHLORIDE 20 MG/ML
2.5-5 INJECTION, SOLUTION INTRAMUSCULAR; INTRAVENOUS; SUBCUTANEOUS EVERY 6 HOURS PRN
Status: DISCONTINUED | OUTPATIENT
Start: 2023-08-28 | End: 2023-08-28

## 2023-08-28 RX ORDER — METHYLERGONOVINE MALEATE 0.2 MG/ML
200 INJECTION INTRAVENOUS
Status: DISCONTINUED | OUTPATIENT
Start: 2023-08-28 | End: 2023-08-30 | Stop reason: HOSPADM

## 2023-08-28 RX ORDER — ACETAMINOPHEN 325 MG/1
975 TABLET ORAL EVERY 6 HOURS PRN
Status: DISCONTINUED | OUTPATIENT
Start: 2023-08-28 | End: 2023-08-28

## 2023-08-28 RX ORDER — NALOXONE HYDROCHLORIDE 0.4 MG/ML
0.4 INJECTION, SOLUTION INTRAMUSCULAR; INTRAVENOUS; SUBCUTANEOUS
Status: DISCONTINUED | OUTPATIENT
Start: 2023-08-28 | End: 2023-08-28

## 2023-08-28 RX ADMIN — Medication: at 14:33

## 2023-08-28 RX ADMIN — Medication 340 ML/HR: at 18:12

## 2023-08-28 RX ADMIN — FENTANYL CITRATE 100 MCG: 50 INJECTION, SOLUTION INTRAMUSCULAR; INTRAVENOUS at 14:33

## 2023-08-28 RX ADMIN — ACETAMINOPHEN 975 MG: 325 TABLET, FILM COATED ORAL at 19:48

## 2023-08-28 RX ADMIN — Medication 2 MILLI-UNITS/MIN: at 09:37

## 2023-08-28 RX ADMIN — ROPIVACAINE HYDROCHLORIDE 8 ML: 2 INJECTION, SOLUTION EPIDURAL; INFILTRATION at 14:33

## 2023-08-28 RX ADMIN — DOCUSATE SODIUM 100 MG: 100 CAPSULE, LIQUID FILLED ORAL at 19:49

## 2023-08-28 RX ADMIN — IBUPROFEN 800 MG: 400 TABLET ORAL at 19:45

## 2023-08-28 RX ADMIN — SODIUM CHLORIDE, POTASSIUM CHLORIDE, SODIUM LACTATE AND CALCIUM CHLORIDE: 600; 310; 30; 20 INJECTION, SOLUTION INTRAVENOUS at 08:41

## 2023-08-28 ASSESSMENT — ACTIVITIES OF DAILY LIVING (ADL)
ADLS_ACUITY_SCORE: 18

## 2023-08-28 NOTE — PROGRESS NOTES
Returned for AROM and cervix is 3/70/-2 and still somewhat posterior but much more effaced and fetal station well applied  Initially blood tinged fluid noted with AROM but due to cervical blood. Then additional copious clear fluid noted thereafter.  Currently on 8mu/min of pit  Cat 1 tracing.   Ctx q2-3 and starting to feel slightly more uncomfortable.  Epidural as soon as requested

## 2023-08-28 NOTE — PROVIDER NOTIFICATION
08/28/23 1530   Provider Notification   Provider Name/Title Dr. Baires   Method of Notification Electronic Page   Notification Reason SVE;Status Update;Pain;Uterine Activity;Labor Status;Membrane Status     Dr. Baires updated with following information- epidural placed, nicholas catheter in place, pitocin at 8 mu, contractions every 2-3 min, early decelerations with FHR in 120s, pt comfortable, SVE 7-8/90%/-2.

## 2023-08-28 NOTE — PROVIDER NOTIFICATION
08/28/23 1745   Provider Notification   Provider Name/Title Dr. Baires   Method of Notification At Bedside   Request Attend Delivery

## 2023-08-28 NOTE — ANESTHESIA PROCEDURE NOTES
Epidural catheter Procedure Note    Pre-Procedure   Staff -        Anesthesiologist:  Hortencia Garcia MD       Performed By: anesthesiologist       Location: OB       Pre-Anesthestic Checklist: patient identified, IV checked, site marked, risks and benefits discussed, informed consent, monitors and equipment checked, pre-op evaluation and at physician/surgeon's request  Timeout:       Correct Patient: Yes        Correct Procedure: Yes        Correct Site: Yes        Correct Position: Yes   Procedure Documentation  Procedure: epidural catheter       Patient Position: sitting       Skin prep: Betadine       Local skin infiltrated with 1 mL of 1% lidocaine.        Insertion Site: L2-3. (midline approach).       Technique: LORT saline and LORT air        Needle Type: Touhy needle       Needle Gauge: 17.        Needle Length (Inches): 3.5        Catheter: 19 G.          Catheter threaded easily.             # of attempts: 1 and  # of redirects:     Assessment/Narrative         Paresthesias: No.       Test dose of 3 mL lidocaine 1.5% w/ 1:200,000 epinephrine at 14:30 CDT.         Test dose negative, 3 minutes after injection, for signs of intravascular, subdural, or intrathecal injection.       Insertion/Infusion Method: LORT saline and LORT air       Aspiration negative for Heme or CSF via Epidural Catheter.    Medication(s) Administered   0.2% Ropivacaine (Epidural) - EPIDURAL   8 mL - 8/28/2023 2:33:00 PM  Fentanyl PF (Epidural) - EPIDURAL   100 mcg - 8/28/2023 2:33:00 PM   Comments:  Pre-procedure time out completed. Patient in sitting position, the lumbar spine was prepped and draped in sterile fashion. The L2/L3 interspace was identified and local anesthetic was injected for local skin infiltration. A 17 G touhy needle was advanced to the epidural space which was confirmed with the loss of resistance technique at 4.5 cm. A catheter was then advanced easily into the epidural space. The catheter was left at 8.5 cm  "at the skin. Negative aspiration of blood and CSF was confirmed. A test dose of 1.5% lidocaine with 1:200,000 epinephrine was injected through the catheter and was negative for intravascular injection. The site was covered with sterile tegaderm and the catheter was secured with tape.    Orders to manage the epidural infusion have been entered and, through coordination with the nurse, we will continue to manage and monitor the patient's labor epidural.  We will continuously be available to adjust as needed throughout the entire labor and delivery process.      FOR [facility] ([facility] ONLY:   Pain Team Contact information: please page the Pain Team Via [employer]. Search \"Pain\". During daytime hours, please page the attending first. At night please page the resident first.      "

## 2023-08-28 NOTE — PROVIDER NOTIFICATION
08/28/23 1235   Provider Notification   Provider Name/Title Dr. Baires   Method of Notification At Bedside   Request Evaluate in Person   Notification Reason Uterine Activity;Status Update         Dr. Baires at bedside status update given, strip reviewed, second AROM attempt at 1240.  Moderate amount of Clear fluid noted after AROM.

## 2023-08-28 NOTE — H&P
No significant change in general health status based on examination of the patient, review of Nursing Admission Database and prenatal record. IOL for AMA with age 38.  Cervix 2.5/50-70/-2.  AROM attempted this AM and thought to have been done but no fluid seen. On recheck the fetal station had gone to -3 and cervix was thicker so unable to do it on reattempt.  Will start pitocin, monitor for possible AROM/SROM, get epidural late morning, and repeat AROM over the lunch hour if no fluid leaking prior to that.    EFW: 9lb

## 2023-08-28 NOTE — L&D DELIVERY NOTE
of viable male at 1807   Baby's weight 9lbs. 8oz.     Apgars 9, 9     Clear AF  Tiny 1st degree that only due to bleeding was a 3-0 vicryl x2  was placed  EBL 350cc    Primary OB: Dequan

## 2023-08-28 NOTE — ANESTHESIA PREPROCEDURE EVALUATION
Anesthesia Pre-Procedure Evaluation    Patient: Maryanne Head   MRN: [id number] : 1985        Procedure :           Past Medical History:   Diagnosis Date    Abnormal Pap smear of cervix 2021    Depressive disorder, not elsewhere classified     Dr. Caprice Jean-Baptiste - psychiatrist    Female infertility     Genital herpes simplex, unspecified site 2023    Subclinical hypothyroidism 2023      Past Surgical History:   Procedure Laterality Date    BUNIONECTOMY Left 2022    Lapidus bunionectomy with bone allograft wedge and external fixator    COLPOSCOPY      GYN SURGERY  2022    egg retrieval      Allergies   Allergen Reactions    Bacitracin Rash     Topical only         Social History     Tobacco Use    Smoking status: Former     Types: Cigarettes    Smokeless tobacco: Never    Tobacco comments:     smoked for about 2 yrs about  ppd   Substance Use Topics    Alcohol use: Not Currently     Comment: rare      Wt Readings from Last 1 Encounters:   23 80.7 kg (178 lb)        Prior to Admission medications    Medication Sig Start Date End Date Taking? Authorizing Provider   famotidine (PEPCID) 20 MG tablet Take 20 mg by mouth 2 times daily 23  Yes Reported, Patient   levothyroxine (SYNTHROID/LEVOTHROID) 50 MCG tablet Take 1 tablet (50 mcg) by mouth daily 23  Yes Marlyn Baires MD   Prenatal Vit-Fe Fumarate-FA (PRENATAL VITAMIN PO)    Yes Reported, Patient   senna-docusate (SENOKOT-S/PERICOLACE) 8.6-50 MG tablet Take 1 tablet by mouth 2 times daily as needed 23  Yes Reported, Patient   valACYclovir (VALTREX) 500 MG tablet Take 1 tablet (500 mg) by mouth daily Starting at 35-36 weeks 23  Yes Marlyn Baires MD   polyethylene glycol 3350 POWD Take 17 g by mouth 23   Reported, Patient   valACYclovir (VALTREX) 500 MG tablet Take 1 tablet (500 mg) by mouth 2 times daily for 3 days 23  Marlyn Baires MD     Anesthesia Evaluation             ROS/MED HX  ENT/Pulmonary:       Neurologic:       Cardiovascular:       METS/Exercise Tolerance:     Hematologic:       Musculoskeletal:       GI/Hepatic:     (+) GERD,                   Renal/Genitourinary:       Endo:     (+)          thyroid problem, hypothyroidism,           Psychiatric/Substance Use:       Infectious Disease:       Malignancy:       Other:            Physical Exam    Airway        Mallampati: II   TM distance: > 3 FB   Neck ROM: full   Mouth opening: > 3 cm    Respiratory Devices and Support         Dental  no notable dental history         Cardiovascular   cardiovascular exam normal          Pulmonary   pulmonary exam normal                OUTSIDE LABS:  CBC:   Lab Results   Component Value Date    WBC 6.2 08/28/2023    HGB 12.6 08/28/2023    HCT 35.2 08/28/2023     (L) 08/28/2023     BMP: No results found for: NA, POTASSIUM, CHLORIDE, CO2, BUN, CR, GLC  COAGS: No results found for: PTT, INR, FIBR  POC:   Lab Results   Component Value Date    HCG Negative 12/21/2005     HEPATIC: No results found for: ALBUMIN, PROTTOTAL, ALT, AST, GGT, ALKPHOS, BILITOTAL, BILIDIRECT, FLORINDA  OTHER:   Lab Results   Component Value Date    A1C 5.1 04/14/2021    TSH 1.63 08/18/2023       Anesthesia Plan    ASA Status:  2       Anesthesia Type: Epidural.              Consents    Anesthesia Plan(s) and associated risks, benefits, and realistic alternatives discussed. Questions answered and patient/representative(s) expressed understanding.     - Discussed:     - Discussed with:  Spouse            Postoperative Care            Comments:                Hortencia Garcia MD

## 2023-08-28 NOTE — PLAN OF CARE
39w1d admitted for induction of labor due to AMA. External monitors applied after verbal consent. Admission data base obtained. Vital signs obtained. Pt oriented to room and informed of plan of care.

## 2023-08-29 LAB
BASOPHILS # BLD AUTO: 0 10E3/UL (ref 0–0.2)
BASOPHILS NFR BLD AUTO: 0 %
EOSINOPHIL # BLD AUTO: 0.1 10E3/UL (ref 0–0.7)
EOSINOPHIL NFR BLD AUTO: 1 %
ERYTHROCYTE [DISTWIDTH] IN BLOOD BY AUTOMATED COUNT: 12.7 % (ref 10–15)
HCT VFR BLD AUTO: 34.5 % (ref 35–47)
HGB BLD-MCNC: 12.3 G/DL (ref 11.7–15.7)
IMM GRANULOCYTES # BLD: 0.1 10E3/UL
IMM GRANULOCYTES NFR BLD: 1 %
LYMPHOCYTES # BLD AUTO: 1.3 10E3/UL (ref 0.8–5.3)
LYMPHOCYTES NFR BLD AUTO: 12 %
MCH RBC QN AUTO: 33.4 PG (ref 26.5–33)
MCHC RBC AUTO-ENTMCNC: 35.7 G/DL (ref 31.5–36.5)
MCV RBC AUTO: 94 FL (ref 78–100)
MONOCYTES # BLD AUTO: 0.9 10E3/UL (ref 0–1.3)
MONOCYTES NFR BLD AUTO: 8 %
NEUTROPHILS # BLD AUTO: 8.8 10E3/UL (ref 1.6–8.3)
NEUTROPHILS NFR BLD AUTO: 78 %
NRBC # BLD AUTO: 0 10E3/UL
NRBC BLD AUTO-RTO: 0 /100
PLATELET # BLD AUTO: 131 10E3/UL (ref 150–450)
RBC # BLD AUTO: 3.68 10E6/UL (ref 3.8–5.2)
WBC # BLD AUTO: 11.1 10E3/UL (ref 4–11)

## 2023-08-29 PROCEDURE — 36415 COLL VENOUS BLD VENIPUNCTURE: CPT | Performed by: OBSTETRICS & GYNECOLOGY

## 2023-08-29 PROCEDURE — 120N000012 HC R&B POSTPARTUM

## 2023-08-29 PROCEDURE — 250N000013 HC RX MED GY IP 250 OP 250 PS 637: Performed by: OBSTETRICS & GYNECOLOGY

## 2023-08-29 PROCEDURE — 85025 COMPLETE CBC W/AUTO DIFF WBC: CPT | Performed by: OBSTETRICS & GYNECOLOGY

## 2023-08-29 RX ADMIN — IBUPROFEN 800 MG: 400 TABLET ORAL at 22:57

## 2023-08-29 RX ADMIN — ACETAMINOPHEN 975 MG: 325 TABLET, FILM COATED ORAL at 22:57

## 2023-08-29 RX ADMIN — ACETAMINOPHEN 975 MG: 325 TABLET, FILM COATED ORAL at 03:54

## 2023-08-29 RX ADMIN — ACETAMINOPHEN 975 MG: 325 TABLET, FILM COATED ORAL at 10:18

## 2023-08-29 RX ADMIN — DOCUSATE SODIUM 100 MG: 100 CAPSULE, LIQUID FILLED ORAL at 10:18

## 2023-08-29 RX ADMIN — LEVOTHYROXINE SODIUM 50 MCG: 50 TABLET ORAL at 07:14

## 2023-08-29 RX ADMIN — POLYETHYLENE GLYCOL 3350 17 G: 17 POWDER, FOR SOLUTION ORAL at 10:17

## 2023-08-29 RX ADMIN — IBUPROFEN 800 MG: 400 TABLET ORAL at 16:21

## 2023-08-29 RX ADMIN — ACETAMINOPHEN 975 MG: 325 TABLET, FILM COATED ORAL at 16:21

## 2023-08-29 RX ADMIN — IBUPROFEN 800 MG: 400 TABLET ORAL at 10:18

## 2023-08-29 RX ADMIN — IBUPROFEN 800 MG: 400 TABLET ORAL at 03:54

## 2023-08-29 ASSESSMENT — ACTIVITIES OF DAILY LIVING (ADL)
ADLS_ACUITY_SCORE: 18

## 2023-08-29 NOTE — PLAN OF CARE
Goal Outcome Evaluation:    Plan of Care Reviewed With: patient, spouse    Overall Patient Progress: improving      Patient arrived to [facility] @ 2050. Vital signs stable. Postpartum assessment WDL. Pain controlled with PO tylenol and ibuprofen. Patient voiding without difficulty. Breastfeeding on cue independently, infant having occasional sleepy attempts at breast. Patient and infant bonding well. Will continue with current plan of care.      Male Completion Statement: After discussing his treatment course we decided to discontinue isotretinoin therapy at this time. He shouldn't donate blood for one month after the last dose. He should call with any new symptoms of depression.

## 2023-08-29 NOTE — DISCHARGE INSTRUCTIONS

## 2023-08-29 NOTE — LACTATION NOTE
"This note was copied from a baby's chart.  Initial lactation visit with ACACIA Madden and baby boy Adan. Maryanne states infant  very well after delivery but has been sleepy for some feedings. Bedside RN demonstrated hand expression and patient grateful for education. Infant breastfeeding at time of visit. Reviewed signs of proper latch. Maryanne shares infant likes to \"tuck his lip.\" Reviewed techniques for helping to maintain wider open mouth with latch including using cross cradle hold and having chin and bottom lip touch breast first. Discussed how to help infant drop chin and roll bottom lip out after latch. Family receptive to suggestions.     Discussed normal  feeding behaviors including sleepiness first 24 hours followed by increased alertness and cluster feeding. Discussed when to expect mature milk supply, feeding cues and encouraged family to attempt to breast feed when infant cueing, ensuring 8 or more feedings every 24 hours. Maryanne reports history of mastitis x 2 after first child. Encouraged Maryanne to reach out to outpatient lactation if recurrent mastitis or plugged ducts. All questions answered. Will revisit as needed.   "

## 2023-08-29 NOTE — PROGRESS NOTES
"S: Pt doing well. Infant is being . Pain is controlled with current analgesics.  Medication(s) being used: acetaminophen and ibuprofen (OTC).    O: /74 (BP Location: Left arm, Patient Position: Semi-Grant's)   Pulse 67   Temp 98  F (36.7  C) (Oral)   Resp 16   Ht 1.626 m (5' 4\")   Wt 80.7 kg (178 lb)   LMP 11/27/2022   SpO2 99%   Breastfeeding Yes   BMI 30.55 kg/m          ABD:  Uterine fundus is firm, non-tender and at the level of the umbilicus                Hemoglobin   Date Value Ref Range Status   08/29/2023 12.3 11.7 - 15.7 g/dL Final          No results found for: RH    A:  Post-partum day #1 s/p Normal spontaneous vaginal delivery    P: Continue PP Cares    Patient is considering d/c later today but given time of delivery may delay until tomorrow.    Orders placed but can cancel and delay until tomorrow if peds doesn't clear baby or patient changes mind     "

## 2023-08-29 NOTE — PLAN OF CARE
Patient doing well, having adequate pain control with tylenol and ibuprofen PO.  Benzocaine spray given for comfort as well.  Using ice packs and tucks to perineum.  Patient complains of mild to moderate coccyx discomfort.  Encouraged ambulation in the hallways.  Fundus firm, lochia scant.  Sore nipple shells given and lanolin.  Encouraged patient to call with questions/concerns.

## 2023-08-29 NOTE — PLAN OF CARE
Viable male infant born vaginally at 1807, delivered by Dr. Baires.  placed on mother's abdomen for delayed cord clamping, drying and stimulating. After delayed cord clamping, umbilical cord clamped and cut and  placed skin to skin with mother. Apgars of 9 and 9.  1st degree laceration of perineum repaired and ice placed to perineum. Fundus firm with rubra blood no clots. Routine postpartum orders. Patient plans to breastfeed.

## 2023-08-29 NOTE — L&D DELIVERY NOTE
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  However, the patient then chose to transfer her care outside of her workplace and began seeing myself at 32 weeks.  The patient is blood type A positive.  She is rubella immune, and she is group B strep negative.  She had subclinical hypothyroidism and was started on 50 mcg of levothyroxine for in vitro and her TSH was well controlled throughout pregnancy.  Of note, the patient also has a history of genital herpes and she did have an outbreak just after 32 weeks' gestation that was treated and then the patient was maintained on daily preventative Valtrex every day thereafter.  The patient did fail her 1-hour GCT at 146, but then passed all 4 values of her 3-hour GTT without any issues.  Of note, she also had had normal NIPT testing and normal anatomy ultrasound as a level 2.  She did have a low lying placenta, initially, but this resolved.  With her 1st she was induced at 41 weeks 5 days for postdates and had a very long cervical ripening and induction process.  She then also had a 4 hour 2nd stage of labor and delivered a viable female infant that was 8 pounds 15 ounces.  With this pregnancy, there was also presumed macrosomia noted, but because of the patient's age of 38 and the suspected macrosomia, the plan was to induce her at 39 weeks.  Because also of maternal age of 38, she had growth scan obtained at 34 weeks' gestation that was an estimated fetal weight of 6 pounds 3 ounces in the 89th percentile and that she had weekly BPPs thereafter that were normal.  She did have 1 final growth scan at 37 weeks' gestation, which was 8 pounds 3 ounces, consistent with the 90th percentile.    Upon arrival, the patient had attempt at artificial rupture of membranes, but initially the infant was fairly well  applied and low station during a contraction, and attempt at artificial rupture of membrane was done, but no fluid was noted.  On secondary attempt, the fetal station had gone to -3 and the cervix was quite posterior and somewhat thick, although 2.5 cm, and so decision was made to start her on Pitocin augmentation and then perform artificial rupture of membranes at a later time.  The patient reached 8 milliunits per minute of Pitocin and artificial rupture of membranes was then done with clear fluid noted at approximately 12:40 p.m.  She became uncomfortable fairly quickly thereafter and got an epidural for pain control and then progressed rapidly through labor becoming complete at 1725.  The patient began actively pushing at 1740 and with excellent maternal pushing effort went on to deliver a viable male infant via the left occiput anterior position at 1807.  Apgars were 9 and 9 at 1 and 5 minutes respectively, and the weight was 9 pounds 8 ounces.  The infant was placed onto the maternal chest and after cord clamping was delayed for just over a minute, the cord was doubly clamped and cut by father of the infant.  The placenta then delivered intact with a normal 3-vessel cord at 1812.  The patient's estimated blood loss was 350 mL.  The patient had a small nonbleeding abrasion running vertically between the labia minora superiorly just underneath the clitoris, but this was hemostatic and not repaired.  She had a tiny laceration slightly off to the right side of the vagina, but it was more briskly bleeding and 2 figure-of-X 3-0 Vicryl sutures were placed through this just for hemostasis and this was obtained.  Both mother and infant were doing extremely well immediately after delivery and there were no complications.  Sponge and needle counts were correct x 2.    STAGES OF LABOR:  First stage is not documented, 2nd stage 42 minutes, 3rd stage 5 minutes.    DIAGNOSES:    1.  Intrauterine pregnancy at 39+1 weeks,  brought in for induction of labor due to maternal age of 38.  2.  Presumed macrosomia.  3.  Artificial rupture of membranes and Pitocin induction of labor.  4.  Epidural for pain control.  5.  Normal spontaneous vaginal delivery of a viable male infant.  6.  Small 1st degree laceration, repaired in the normal fashion.    Marlyn Baires MD        D: 2023   T: 2023   MT:     Name:     RIK BETANCOURT  MRN:      [id number]        Account:       [id number]   :      1985           Delivery Date: 2023     Document: [id number]    cc:  [facility]

## 2023-08-29 NOTE — PROGRESS NOTES
Data: Maryanne Head transferred to 410 via wheelchair at 2050. Baby transferred via parent's arms.  Action: Receiving unit notified of transfer: Yes. Patient and family notified of room change. Report given to RADHA Marinelli at 2050. Belongings sent to receiving unit. Accompanied by Registered Nurse. Oriented patient to surroundings. Call light within reach. ID bands double-checked with receiving RN.  Response: Patient tolerated transfer and is stable.

## 2023-08-29 NOTE — PROGRESS NOTES
Patient arrived to [facility] @ 2050 in wheelchair holding infant, accompanied by spouse, Jake, with personal belongings. Infant placed in bassinet and patient ambulated with stand-by assist from wheelchair to bed. Bedside report received from L&D RN. Patient and infant safety bands verified with L&D RN. Patient and spouse oriented to room and safety features covered.

## 2023-08-30 VITALS
RESPIRATION RATE: 16 BRPM | DIASTOLIC BLOOD PRESSURE: 72 MMHG | HEIGHT: 64 IN | HEART RATE: 70 BPM | OXYGEN SATURATION: 99 % | BODY MASS INDEX: 30.39 KG/M2 | TEMPERATURE: 98.2 F | WEIGHT: 178 LBS | SYSTOLIC BLOOD PRESSURE: 107 MMHG

## 2023-08-30 PROCEDURE — 250N000013 HC RX MED GY IP 250 OP 250 PS 637: Performed by: OBSTETRICS & GYNECOLOGY

## 2023-08-30 RX ORDER — TRIAMCINOLONE ACETONIDE 1 MG/G
OINTMENT TOPICAL 2 TIMES DAILY
Status: DISCONTINUED | OUTPATIENT
Start: 2023-08-30 | End: 2023-08-30 | Stop reason: HOSPADM

## 2023-08-30 RX ORDER — TRIAMCINOLONE ACETONIDE 1 MG/G
OINTMENT TOPICAL 2 TIMES DAILY
Qty: 60 G | Refills: 0 | Status: SHIPPED | OUTPATIENT
Start: 2023-08-30 | End: 2023-09-04

## 2023-08-30 RX ADMIN — IBUPROFEN 800 MG: 400 TABLET ORAL at 06:48

## 2023-08-30 RX ADMIN — POLYETHYLENE GLYCOL 3350 17 G: 17 POWDER, FOR SOLUTION ORAL at 09:05

## 2023-08-30 RX ADMIN — PRENATAL VITAMINS-IRON FUMARATE 27 MG IRON-FOLIC ACID 0.8 MG TABLET 1 TABLET: at 09:07

## 2023-08-30 RX ADMIN — ACETAMINOPHEN 975 MG: 325 TABLET, FILM COATED ORAL at 06:49

## 2023-08-30 RX ADMIN — LEVOTHYROXINE SODIUM 50 MCG: 50 TABLET ORAL at 06:48

## 2023-08-30 RX ADMIN — DOCUSATE SODIUM 100 MG: 100 CAPSULE, LIQUID FILLED ORAL at 09:06

## 2023-08-30 ASSESSMENT — ACTIVITIES OF DAILY LIVING (ADL)
ADLS_ACUITY_SCORE: 18

## 2023-08-30 NOTE — PROGRESS NOTES
"S: Pt doing well. Infant is being . Pain is controlled with current analgesics.  Medication(s) being used: acetaminophen and ibuprofen (OTC).    O: BP 98/63 (BP Location: Left arm, Patient Position: Semi-Grant's, Cuff Size: Adult Regular)   Pulse 79   Temp 98.7  F (37.1  C) (Oral)   Resp 16   Ht 1.626 m (5' 4\")   Wt 80.7 kg (178 lb)   LMP 11/27/2022   SpO2 99%   Breastfeeding Yes   BMI 30.55 kg/m          ABD:  Uterine fundus is firm, non-tender and at the level of the umbilicus                Hemoglobin   Date Value Ref Range Status   08/29/2023 12.3 11.7 - 15.7 g/dL Final          No results found for: RH    A:  Post-partum day #2 s/p Normal spontaneous vaginal delivery    P: Continue PP Cares    Discharge home and f/up in 6 weeks    Triamcinolone rx sent to pharmacy for adhesive dermatitis on back around epidural site    "

## 2023-08-30 NOTE — PLAN OF CARE
Patient doing well, excited to go home.  States tailbone/back discomfort is improving over all.  Cream given for rash on back.  Fundus firm, lochia scant.  Discharge instructions explained to patient and all questions/concerns addressed.

## 2023-08-30 NOTE — PLAN OF CARE
Goal Outcome Evaluation:  Plan of Care Reviewed With: patient, significant other     Overall Patient Progress: Progressing    VS WDL. Fundus firm, midline, at the U. Lochia scant. Pain managed with tylenol and ibuprofen. Rash on back from adhesive used during epidural, ice and lotion used for comfort. Breastfeeding well independently. Spouse at bedside. Parents independent with  cares.

## 2023-09-08 ENCOUNTER — TELEPHONE (OUTPATIENT)
Dept: OBGYN | Facility: CLINIC | Age: 38
End: 2023-09-08
Payer: COMMERCIAL

## 2023-09-08 NOTE — TELEPHONE ENCOUNTER
Type of Paperwork received:  Disability and leave (FMLA)     Date Rcvd:  9/8    Rcvd From (Company name): [employer]    Provider:  Marlyn Baires     Placed on Provider Cart Date:  9/8

## 2023-10-09 NOTE — PROGRESS NOTES
SUBJECTIVE:  Maryanne Head,  is here for a postpartum visit.  She had a  on 2023 delivering a healthy baby boy, named Adan weighing 9 lbs 8 oz at term.      HPI:    Pt presents for her postpartum visit.     Overall things have been going well. At first sleep was ok but in the past 2 weeks, sleep has been more disrupted by frequent waking up by baby who has reflux and will not lay flat or without being held upright so not resting more than 2 hours at a time. Have just started him on simethicone to see if it will help. However eating well, breastfeeding is going well and he's clearly gaining weight and has his f/up with peds in one week so can tmie out to when Novie is due    Bleeding stopped 2 weeks ago but mentioned possible bladder prolapse. She recalls mild bladder prolapse with last child but this got better with time and nothing more than that. Feels like was wiping and felt more tissue so looked with a mirror and saw a lot of extra tissue there. Denies incontinence other than very minor if bears down to pass gas     Jake will be getting a vas for their contraception but don't have any speicific urologist in mind.  .  Has had Mirena before Novalie and liked it but the insertion was awful. Had a paragard and hated it.   Feels like overall mood is ok and managing and knows this fussy lack of sleep period will pass/improve    Last PHQ-9 score on record=       10/11/2023    11:28 AM   PHQ-9 SCORE   PHQ-9 Total Score 4         10/11/2023    11:28 AM   AISLINN-7 SCORE   Total Score 1       Pap:  Lab Results   Component Value Date    PAP ASC-US 2021       Delivery complications:  No  Breast feeding:  Yes,   Bladder problems: GRADE 1 CYSTOCELE  Bowel problems/hemorrhoids:  No  Episiotomy/laceration/incision healed? Yes:   Vaginal flow:  None  [place]:  No  Contraception: none  Emotional adjustment:  doing well and tired  Back to work:  2023    OBJECTIVE:  Vitals: /68   Ht 1.645 m  "(5' 4.75\")   Wt 70.6 kg (155 lb 9.6 oz)   LMP 2022   Breastfeeding Yes   BMI 26.09 kg/m    BMI= Body mass index is 26.09 kg/m .  General - pleasant female in no acute distress.  Breast -  deferred  Abdomen - No incision  Pelvic - EG: normal adult female, BUS: within normal limits, Vagina: well rugated, APPROPRIATE DISCHARGE, SOME GRADE 1 CYSTOCELE AND RECTOCELE WITH VALSALVA AND RECUMBENT, LIKELY  MORE PRONOUNCED IF STANDING BUT NOT SEVERE AND NON GAPING INTROITUS Cervix: no lesions or CMT, Uterus: firm, normal sized and nontender, anteverted in position. Adnexae: no masses or tenderness.  Rectovaginal - deferred.    ASSESSMENT:    ICD-10-CM    1. Routine postpartum follow-up  Z39.2       2. Need for prophylactic vaccination and inoculation against influenza  Z23       3. Acquired hypothyroidism  E03.9 TSH     TSH        PLAN:  May resume normal activities without restrictions.  Pap smear was not done today and is UTD for 1 more year    TSH check today and then will adjust or even potentially stop her levothyroxine since mostly was just subclinical hypothyroid and put on it for IVF and conception.     Discussed expectations for pelvic floor after 2 pregnancies and 2 NSVDs of large babies.  Reviewed kegel at home, just time, phys therapy and how that normally is done over a few sessions, etc  Also discussed surgical interventions for just incontinence vs prolapse and pessary, etc though she is definitely not exhibiting any signs of prolapse of that degree.    At this point will wait and just work on kegels and give it time but if want referral for phys therapy will contact me.    Given card for urology for  to have vas  Discussed the benefits for cycle control of mirena IUD and the much easier insertion now that had  x2 vs before ever having had a baby and she may consider doing that regardless of vasectomy    Full counseling was provided, and all questions were answered.   Return to clinic in " one year for an annual visit.     Marlyn Baires MD

## 2023-10-11 ENCOUNTER — PRENATAL OFFICE VISIT (OUTPATIENT)
Dept: OBGYN | Facility: CLINIC | Age: 38
End: 2023-10-11
Payer: COMMERCIAL

## 2023-10-11 VITALS
SYSTOLIC BLOOD PRESSURE: 110 MMHG | DIASTOLIC BLOOD PRESSURE: 68 MMHG | HEIGHT: 65 IN | WEIGHT: 155.6 LBS | BODY MASS INDEX: 25.92 KG/M2

## 2023-10-11 DIAGNOSIS — E03.9 ACQUIRED HYPOTHYROIDISM: ICD-10-CM

## 2023-10-11 DIAGNOSIS — Z23 NEED FOR PROPHYLACTIC VACCINATION AND INOCULATION AGAINST INFLUENZA: ICD-10-CM

## 2023-10-11 LAB — TSH SERPL DL<=0.005 MIU/L-ACNC: 1.32 UIU/ML (ref 0.3–4.2)

## 2023-10-11 PROCEDURE — 90471 IMMUNIZATION ADMIN: CPT | Performed by: OBSTETRICS & GYNECOLOGY

## 2023-10-11 PROCEDURE — 99207 PR POST PARTUM EXAM: CPT | Performed by: OBSTETRICS & GYNECOLOGY

## 2023-10-11 PROCEDURE — 84443 ASSAY THYROID STIM HORMONE: CPT | Performed by: OBSTETRICS & GYNECOLOGY

## 2023-10-11 PROCEDURE — 90686 IIV4 VACC NO PRSV 0.5 ML IM: CPT | Performed by: OBSTETRICS & GYNECOLOGY

## 2023-10-11 PROCEDURE — 36415 COLL VENOUS BLD VENIPUNCTURE: CPT | Performed by: OBSTETRICS & GYNECOLOGY

## 2023-10-11 ASSESSMENT — ANXIETY QUESTIONNAIRES
6. BECOMING EASILY ANNOYED OR IRRITABLE: NOT AT ALL
7. FEELING AFRAID AS IF SOMETHING AWFUL MIGHT HAPPEN: NOT AT ALL
1. FEELING NERVOUS, ANXIOUS, OR ON EDGE: NOT AT ALL
IF YOU CHECKED OFF ANY PROBLEMS ON THIS QUESTIONNAIRE, HOW DIFFICULT HAVE THESE PROBLEMS MADE IT FOR YOU TO DO YOUR WORK, TAKE CARE OF THINGS AT HOME, OR GET ALONG WITH OTHER PEOPLE: NOT DIFFICULT AT ALL
3. WORRYING TOO MUCH ABOUT DIFFERENT THINGS: NOT AT ALL
GAD7 TOTAL SCORE: 1
2. NOT BEING ABLE TO STOP OR CONTROL WORRYING: NOT AT ALL
5. BEING SO RESTLESS THAT IT IS HARD TO SIT STILL: NOT AT ALL
GAD7 TOTAL SCORE: 1

## 2023-10-11 ASSESSMENT — PATIENT HEALTH QUESTIONNAIRE - PHQ9
SUM OF ALL RESPONSES TO PHQ QUESTIONS 1-9: 4
5. POOR APPETITE OR OVEREATING: SEVERAL DAYS

## 2023-11-05 ENCOUNTER — MYC MEDICAL ADVICE (OUTPATIENT)
Dept: OBGYN | Facility: CLINIC | Age: 38
End: 2023-11-05
Payer: COMMERCIAL

## 2023-11-05 DIAGNOSIS — E03.9 HYPOTHYROID IN PREGNANCY, ANTEPARTUM, THIRD TRIMESTER: ICD-10-CM

## 2023-11-05 DIAGNOSIS — O99.283 HYPOTHYROID IN PREGNANCY, ANTEPARTUM, THIRD TRIMESTER: ICD-10-CM

## 2023-11-06 RX ORDER — LEVOTHYROXINE SODIUM 50 UG/1
50 TABLET ORAL DAILY
Qty: 90 TABLET | Refills: 0 | Status: SHIPPED | OUTPATIENT
Start: 2023-11-06 | End: 2024-02-15

## 2023-11-17 ENCOUNTER — TELEPHONE (OUTPATIENT)
Dept: OBGYN | Facility: CLINIC | Age: 38
End: 2023-11-17
Payer: COMMERCIAL

## 2023-11-17 NOTE — TELEPHONE ENCOUNTER
[facility] pharmacist calling regarding Valtrex rx. Wanted to know when the pt would be having an outbreak. Informed we would not have any idea as to when she would have an outbreak. Please fill rx as written  Patricia Curtis RN on 11/17/2023 at 12:25 PM

## 2024-01-04 ENCOUNTER — OFFICE VISIT (OUTPATIENT)
Dept: URGENT CARE | Facility: URGENT CARE | Age: 39
End: 2024-01-04
Payer: COMMERCIAL

## 2024-01-04 VITALS
OXYGEN SATURATION: 96 % | SYSTOLIC BLOOD PRESSURE: 108 MMHG | HEART RATE: 82 BPM | TEMPERATURE: 98.1 F | DIASTOLIC BLOOD PRESSURE: 72 MMHG

## 2024-01-04 DIAGNOSIS — B34.9 VIRAL SYNDROME: ICD-10-CM

## 2024-01-04 DIAGNOSIS — R05.1 ACUTE COUGH: Primary | ICD-10-CM

## 2024-01-04 DIAGNOSIS — Z20.822 EXPOSURE TO 2019 NOVEL CORONAVIRUS: ICD-10-CM

## 2024-01-04 LAB
FLUAV AG SPEC QL IA: NEGATIVE
FLUBV AG SPEC QL IA: NEGATIVE

## 2024-01-04 PROCEDURE — 87635 SARS-COV-2 COVID-19 AMP PRB: CPT | Performed by: PHYSICIAN ASSISTANT

## 2024-01-04 PROCEDURE — 87804 INFLUENZA ASSAY W/OPTIC: CPT | Performed by: PHYSICIAN ASSISTANT

## 2024-01-04 PROCEDURE — 99213 OFFICE O/P EST LOW 20 MIN: CPT | Performed by: PHYSICIAN ASSISTANT

## 2024-01-04 NOTE — PROGRESS NOTES
SUBJECTIVE:   Maryanne Head is a 38 year old female presenting with a chief complaint of   Chief Complaint   Patient presents with    Cough     X2 days. No at home covid testing done.       She is a new patient of [facility].  Patient presents with 2 days of dry cough.  No ear pain, ST, no SOB.    Treatment:  dayquil.        Review of Systems    Past Medical History:   Diagnosis Date    Abnormal Pap smear of cervix 04/14/2021 04/14/21    Depressive disorder, not elsewhere classified     Dr. Caprice Jean-Baptiste - psychiatrist    Female infertility     Genital herpes simplex, unspecified site 7/18/2023    Subclinical hypothyroidism 7/18/2023     Family History   Problem Relation Age of Onset    C.A.D. Mother         MI in the past - 2 stents in heart    Hypertension Mother     Alcohol/Drug Mother         sober since 83    Arthritis Mother     Cardiovascular Mother     Depression Mother     Heart Disease Mother     Bronchitis Mother     Coronary Artery Disease Mother     Kidney Disease Mother     Hypertension Father      Current Outpatient Medications   Medication Sig Dispense Refill    levothyroxine (SYNTHROID/LEVOTHROID) 50 MCG tablet Take 1 tablet (50 mcg) by mouth daily 90 tablet 0    Prenatal Vit-Fe Fumarate-FA (PRENATAL VITAMIN PO)       senna-docusate (SENOKOT-S/PERICOLACE) 8.6-50 MG tablet Take 1 tablet by mouth 2 times daily as needed (Patient not taking: Reported on 1/4/2024)      valACYclovir (VALTREX) 500 MG tablet Take 1 tablet (500 mg) by mouth daily But should take 500mg BID for 3 days with active outbreak symptoms (Patient not taking: Reported on 1/4/2024) 90 tablet 1     Social History     Tobacco Use    Smoking status: Former     Types: Cigarettes    Smokeless tobacco: Never    Tobacco comments:     smoked for about 2 yrs about 1/4 ppd   Substance Use Topics    Alcohol use: Not Currently     Comment: rare       OBJECTIVE  /72   Pulse 82   Temp 98.1  F (36.7  C) (Tympanic)   SpO2 96%     Physical  Exam    Labs:  Results for orders placed or performed in visit on 01/04/24 (from the past 24 hour(s))   Influenza A & B Antigen - Clinic Collect    Specimen: Nose; Swab   Result Value Ref Range    Influenza A antigen Negative Negative    Influenza B antigen Negative Negative    Narrative    Test results must be correlated with clinical data. If necessary, results should be confirmed by a molecular assay or viral culture.       ASSESSMENT:      ICD-10-CM    1. Acute cough  R05.1 Symptomatic COVID-19 Virus (Coronavirus) by PCR Nose     Influenza A & B Antigen - Clinic Collect      2. Viral syndrome  B34.9            Medical Decision Making:    Differential Diagnosis:  URI Adult/Peds:  Viral syndrome, Viral upper respiratory illness, and covid    Serious Comorbid Conditions:  Adult:   reviewed    PLAN:    Discussed and recommended CDC guidelines for self isolation.  COVID test pending.  Continue with dayquil or robitussin OTC.  Discussed reasons to seek immediate medical attention.  Additionally if no improvement or worsening in one week, may follow up with PCP and/or UC.        Followup:    If not improving or if condition worsens, follow up with your Primary Care Provider, If not improving or if conditions worsens over the next 12-24 hours, go to the Emergency Department    There are no Patient Instructions on file for this visit.

## 2024-01-05 LAB — SARS-COV-2 RNA RESP QL NAA+PROBE: NEGATIVE

## 2024-02-04 ENCOUNTER — HEALTH MAINTENANCE LETTER (OUTPATIENT)
Age: 39
End: 2024-02-04

## 2024-03-01 DIAGNOSIS — E03.9 HYPOTHYROID IN PREGNANCY, ANTEPARTUM, THIRD TRIMESTER: ICD-10-CM

## 2024-03-01 DIAGNOSIS — O99.283 HYPOTHYROID IN PREGNANCY, ANTEPARTUM, THIRD TRIMESTER: ICD-10-CM

## 2024-03-01 RX ORDER — LEVOTHYROXINE SODIUM 50 UG/1
50 TABLET ORAL DAILY
Qty: 90 TABLET | Refills: 0 | OUTPATIENT
Start: 2024-03-01

## 2024-03-01 NOTE — TELEPHONE ENCOUNTER
Requested Prescriptions   Pending Prescriptions Disp Refills    levothyroxine (SYNTHROID/LEVOTHROID) 50 MCG tablet 90 tablet 0     Sig: Take 1 tablet (50 mcg) by mouth daily       Thyroid Protocol Failed - 3/1/2024 10:12 AM        Failed - Recent (12 mo) or future (30 days) visit within the authorizing provider's specialty     The patient must have completed an in-person or virtual visit within the past 12 months or has a future visit scheduled within the next 90 days with the authorizing provider s specialty.  Urgent care and e-visits do not quality as an office visit for this protocol.          Passed - Patient is 12 years or older        Passed - Medication is active on med list        Passed - Normal TSH on file in past 12 months     Recent Labs   Lab Test 10/11/23  1211   TSH 1.32              Passed - No active pregnancy on record     If patient is pregnant or has had a positive pregnancy test, please check TSH.          Passed - No positive pregnancy test in past 12 months     If patient is pregnant or has had a positive pregnancy test, please check TSH.             Last Written Prescription Date:  11/06/23  Last Fill Quantity: 90,  # refills: 0   Last office visit: Visit date not found ; last virtual visit: 7/10/2023 with prescribing provider:  Dequan   Future Office Visit:      Refill denied  Pt to contact the clinic for further refills  Patricia Curtis RN on 3/1/2024 at 12:48 PM

## 2024-03-08 ENCOUNTER — TELEPHONE (OUTPATIENT)
Dept: OBGYN | Facility: CLINIC | Age: 39
End: 2024-03-08
Payer: COMMERCIAL

## 2024-03-08 DIAGNOSIS — E03.9 HYPOTHYROID IN PREGNANCY, ANTEPARTUM, THIRD TRIMESTER: ICD-10-CM

## 2024-03-08 DIAGNOSIS — O99.283 HYPOTHYROID IN PREGNANCY, ANTEPARTUM, THIRD TRIMESTER: ICD-10-CM

## 2024-03-08 RX ORDER — LEVOTHYROXINE SODIUM 50 UG/1
50 TABLET ORAL DAILY
Qty: 90 TABLET | Refills: 0 | OUTPATIENT
Start: 2024-03-08

## 2024-03-08 NOTE — TELEPHONE ENCOUNTER
[facility] Call Center    Phone Message    May a detailed message be left on voicemail: yes     Reason for Call: Other: Qian from [facility] pharmacy was calling in regards to a refill request that was sent over for levothyroxine (SYNTHROID/LEVOTHROID) 50 MCG tablet. She is looking for approval for this refill. Please call back with any questions or concerns at [phone]     Action Taken: Other: OBGYN    Travel Screening: Not Applicable

## 2024-03-08 NOTE — TELEPHONE ENCOUNTER
10/11/2023 last OV w Dr Baires  Lab result notes back in October 2023:  Maryanne,   Your TSH is looking good. I think if you want to stay on the levothyroxine you could but I also think it would be totally reasonable to stop it and repeat the tSH in 3-4 months since you really didn't have true hypothyroidism and were just started on meds for IVF.  However you're not over corrected on 50mcg and your TSH is totally normal on it, so if you do want to stay on it that is reasonable too.   Just let me know what you prefer and I'll pend the repeat TSH for 6 months if you stay on meds and for 3-4 months if you go off, and if you stay on then I can send in refills as well.   Marlyn Baires MD      Called pt to check in on her decision from the lab recommendations- she ran out of it back in mid February 2024 and just decided to stay off of it and has been going well so far. She would like to stay off of it and will recheck her TSH lab in 3-4 months from when she stopped. She will do this and reach out earlier if has any issues or concerns.    Refill refused and message sent to pharmacy. Removed from med profile and FYI sent to Dr Baires.    Yulia Huntley RN on 3/8/2024 at 2:27 PM

## 2024-06-09 ENCOUNTER — HOSPITAL ENCOUNTER (OUTPATIENT)
Dept: GENERAL RADIOLOGY | Facility: HOSPITAL | Age: 39
Discharge: HOME | End: 2024-06-09
Attending: STUDENT IN AN ORGANIZED HEALTH CARE EDUCATION/TRAINING PROGRAM | Admitting: STUDENT IN AN ORGANIZED HEALTH CARE EDUCATION/TRAINING PROGRAM
Payer: COMMERCIAL

## 2024-06-09 ENCOUNTER — OFFICE VISIT (OUTPATIENT)
Dept: FAMILY MEDICINE | Facility: CLINIC | Age: 39
End: 2024-06-09
Payer: COMMERCIAL

## 2024-06-09 VITALS
SYSTOLIC BLOOD PRESSURE: 113 MMHG | WEIGHT: 156.6 LBS | TEMPERATURE: 98.3 F | DIASTOLIC BLOOD PRESSURE: 70 MMHG | BODY MASS INDEX: 26.26 KG/M2 | HEART RATE: 73 BPM | OXYGEN SATURATION: 97 %

## 2024-06-09 DIAGNOSIS — M77.8 EXTENSOR TENDONITIS OF FOOT: Primary | ICD-10-CM

## 2024-06-09 DIAGNOSIS — M79.671 RIGHT FOOT PAIN: ICD-10-CM

## 2024-06-09 PROCEDURE — 73630 X-RAY EXAM OF FOOT: CPT | Mod: RT

## 2024-06-09 PROCEDURE — 99213 OFFICE O/P EST LOW 20 MIN: CPT | Performed by: STUDENT IN AN ORGANIZED HEALTH CARE EDUCATION/TRAINING PROGRAM

## 2024-06-09 NOTE — PROGRESS NOTES
Assessment & Plan     Extensor tendonitis of foot  X-ray negative for fracture. Advised walking boot to immobilize and allow for rest of the extensor tendon of the foot, icing, elevation, ibuprofen as needed. Wear the walking boot for 2-4 weeks or until the pain has resolved and able to walk without pain. Follow up if symptoms persist or worsen.    - Ankle/Foot Bracing Supplies Order Walking Boot; Right; Pneumatic; Short    Right foot pain  - XR Foot Right G/E 3 Views         No follow-ups on file.    ZAYNAB Hampton CNP  [facility] MILA Madden is a 38 year old female who presents to clinic today for the following health issues:  Chief Complaint   Patient presents with    Musculoskeletal Problem     Pt complained of swollen right foot ,which started a week ago. Pt wants to do an xray.     HPI        Review of Systems  Constitutional, HEENT, cardiovascular, pulmonary, gi and gu systems are negative, except as otherwise noted.      Objective    /70   Pulse 73   Temp 98.3  F (36.8  C) (Oral)   Wt 71 kg (156 lb 9.6 oz)   SpO2 97%   BMI 26.26 kg/m    Physical Exam   GENERAL: alert and no distress  MS: focal tenderness to palpation, swelling overlying distal 3rd and 4th metatarsals  SKIN: no suspicious lesions or rashes  NEURO: Normal strength and tone, mentation intact and speech normal    Results for orders placed or performed during the hospital encounter of 06/09/24   XR Foot Right G/E 3 Views     Status: None    Narrative    EXAM: XR FOOT RIGHT G/E 3 VIEWS  LOCATION: [facility]  DATE: 6/9/2024    INDICATION: right foot pain x 1 week; focal tenderness mid to distal 3rd adn 4th metatarsals  COMPARISON: None.      Impression    IMPRESSION: The right foot is negative for fracture or dislocation. No erosive changes or focal bone lesion.

## 2024-09-01 ENCOUNTER — HEALTH MAINTENANCE LETTER (OUTPATIENT)
Age: 39
End: 2024-09-01

## 2025-03-13 ENCOUNTER — PATIENT OUTREACH (OUTPATIENT)
Dept: CARE COORDINATION | Facility: CLINIC | Age: 40
End: 2025-03-13
Payer: COMMERCIAL

## 2025-03-27 ENCOUNTER — PATIENT OUTREACH (OUTPATIENT)
Dept: CARE COORDINATION | Facility: CLINIC | Age: 40
End: 2025-03-27
Payer: COMMERCIAL

## 2025-05-31 ENCOUNTER — HEALTH MAINTENANCE LETTER (OUTPATIENT)
Age: 40
End: 2025-05-31